# Patient Record
Sex: MALE | Race: WHITE | NOT HISPANIC OR LATINO | Employment: FULL TIME | URBAN - METROPOLITAN AREA
[De-identification: names, ages, dates, MRNs, and addresses within clinical notes are randomized per-mention and may not be internally consistent; named-entity substitution may affect disease eponyms.]

---

## 2017-03-31 ENCOUNTER — OFFICE VISIT (OUTPATIENT)
Dept: SPORTS MEDICINE | Facility: CLINIC | Age: 57
End: 2017-03-31

## 2017-03-31 VITALS
OXYGEN SATURATION: 98 % | HEART RATE: 54 BPM | SYSTOLIC BLOOD PRESSURE: 132 MMHG | BODY MASS INDEX: 24.25 KG/M2 | WEIGHT: 160 LBS | DIASTOLIC BLOOD PRESSURE: 78 MMHG | HEIGHT: 68 IN | RESPIRATION RATE: 14 BRPM

## 2017-03-31 DIAGNOSIS — M99.05 SOMATIC DYSFUNCTION OF PELVIS REGION: ICD-10-CM

## 2017-03-31 DIAGNOSIS — M54.50 ACUTE RIGHT-SIDED LOW BACK PAIN WITHOUT SCIATICA: Primary | ICD-10-CM

## 2017-03-31 DIAGNOSIS — M99.04 SACRAL REGION SOMATIC DYSFUNCTION: ICD-10-CM

## 2017-03-31 DIAGNOSIS — M99.03 LUMBAR REGION SOMATIC DYSFUNCTION: ICD-10-CM

## 2017-03-31 PROCEDURE — 98926 OSTEOPATH MANJ 3-4 REGIONS: CPT | Performed by: FAMILY MEDICINE

## 2017-03-31 PROCEDURE — 99213 OFFICE O/P EST LOW 20 MIN: CPT | Performed by: FAMILY MEDICINE

## 2017-03-31 RX ORDER — VARDENAFIL HYDROCHLORIDE 20 MG/1
TABLET ORAL
Status: ON HOLD | COMMUNITY
Start: 2015-02-09 | End: 2021-03-29 | Stop reason: ALTCHOICE

## 2017-03-31 RX ORDER — MAGNESIUM CARB/ALUMINUM HYDROX 105-160MG
TABLET,CHEWABLE ORAL
COMMUNITY
End: 2018-12-18

## 2017-03-31 RX ORDER — DEXTROAMPHETAMINE SACCHARATE, AMPHETAMINE ASPARTATE MONOHYDRATE, DEXTROAMPHETAMINE SULFATE AND AMPHETAMINE SULFATE 5; 5; 5; 5 MG/1; MG/1; MG/1; MG/1
20 CAPSULE, EXTENDED RELEASE ORAL
COMMUNITY
Start: 2017-03-13 | End: 2017-03-31 | Stop reason: SDUPTHER

## 2017-03-31 RX ORDER — AZATHIOPRINE 50 MG/1
150 TABLET ORAL
COMMUNITY
End: 2017-03-31 | Stop reason: SDUPTHER

## 2017-03-31 RX ORDER — MESALAMINE 0.38 G/1
3000 CAPSULE, EXTENDED RELEASE ORAL
COMMUNITY
End: 2017-03-31 | Stop reason: SDUPTHER

## 2017-03-31 RX ORDER — ESCITALOPRAM OXALATE 10 MG/1
10 TABLET ORAL
COMMUNITY
Start: 2016-12-01 | End: 2017-12-01

## 2017-03-31 RX ORDER — AMOXICILLIN 500 MG
1 CAPSULE ORAL
COMMUNITY
End: 2017-03-31 | Stop reason: SDUPTHER

## 2017-03-31 RX ORDER — ERGOCALCIFEROL (VITAMIN D2) 10 MCG
400 TABLET ORAL
COMMUNITY
End: 2018-02-05

## 2017-03-31 NOTE — PROGRESS NOTES
"John is a 57 y.o. year old male    Chief Complaint   Patient presents with   • Back Pain     Low rt side back pain. Some pain better.        History of Present Illness  LBP: x 6 d. Was lifting 50lb dumbbell overhead when pain began. No N/T, foot drop. Pain right side of low back. Took one dose of tizanidine previously written. Has not been doing back HEP as previously written. Requests OMT.    I have reviewed the patient's medical history in detail and updated the computerized patient record.    Review of Systems   Constitutional: Negative for chills and fever.   Respiratory: Negative for chest tightness, shortness of breath and wheezing.    Cardiovascular: Negative for chest pain.   Musculoskeletal: Positive for back pain. Negative for gait problem, neck pain and neck stiffness.   Neurological: Negative for weakness, numbness and headaches.   All other systems reviewed and are negative.      /78 (BP Location: Left arm, Patient Position: Sitting, Cuff Size: Adult)  Pulse 54  Resp 14  Ht 68\" (172.7 cm)  Wt 160 lb (72.6 kg)  SpO2 98%  BMI 24.33 kg/m2    Physical Exam    Vital signs reviewed.   General: No acute distress.  Eyes: conjunctiva clear; pupils equally round and reactive  ENT: external ears and nose atraumatic; oropharynx clear  CV: no peripheral edema, 2+ radial pulse  Resp: normal respiratory effort, no use of accessory muscles  Skin: no rashes or wounds; normal turgor  Psych: mood and affect appropriate; recent and remote memory intact    Osteopathic exam:  Tissue texture changes lumbar, sacral, pelvic region  L2-4 neutral, sidebent left and rotated right  Superior pole right sacrum restricted  Right anterior innominate    Procedure: osteopathic manipulative treatment    Risks, benefits and alternatives discussed with patient and verbal consent obtained. Area of maximal tenderness palpated and documented above. Treatment included: muscle energy, HVLA and myofascial release. Patient " tolerated treatment well and demonstrated decreased tenderness, improved range of motion. Post treatment instructions given verbally.    Diagnoses and all orders for this visit:    Acute right-sided low back pain without sciatica    Lumbar region somatic dysfunction    Sacral region somatic dysfunction    Somatic dysfunction of pelvis region    Other orders  -     escitalopram (LEXAPRO) 10 MG tablet; Take 10 mg by mouth.  -     vardenafil (LEVITRA) 20 MG tablet; TAKE ONE-HALF TO ONE TABLET BY MOUTH 1 HOUR PRIOR TO INTERCOURSE AS NEEDED  -     Discontinue: mesalamine (APRISO) 0.375 G 24 hr capsule; Take 3,000 mg by mouth.  -     Discontinue: amphetamine-dextroamphetamine XR (ADDERALL XR) 20 MG 24 hr capsule; Take 20 mg by mouth.  -     Discontinue: azaTHIOprine (IMURAN) 50 MG tablet; Take 150 mg by mouth.  -     Vitamin D, Cholecalciferol, (CHOLECALCIFEROL) 400 UNITS tablet; Take 400 Units by mouth.  -     magnesium citrate 1.745 GM/30ML solution solution; Take  by mouth.  -     Discontinue: Omega-3 Fatty Acids (FISH OIL) 1200 MG capsule capsule; Take 1 capsule by mouth.      Tolerated OMT as above. Dramatic improvement in pain relief prior to leaving office. Can resume taking tizanidine as previously written. HEP. See back as needed.

## 2018-02-05 ENCOUNTER — OFFICE VISIT (OUTPATIENT)
Dept: INTERNAL MEDICINE | Facility: CLINIC | Age: 58
End: 2018-02-05

## 2018-02-05 VITALS
DIASTOLIC BLOOD PRESSURE: 74 MMHG | HEIGHT: 68 IN | WEIGHT: 165 LBS | BODY MASS INDEX: 25.01 KG/M2 | SYSTOLIC BLOOD PRESSURE: 118 MMHG

## 2018-02-05 DIAGNOSIS — M25.561 CHRONIC PAIN OF BOTH KNEES: Chronic | ICD-10-CM

## 2018-02-05 DIAGNOSIS — G89.29 CHRONIC PAIN OF BOTH KNEES: Chronic | ICD-10-CM

## 2018-02-05 DIAGNOSIS — F98.8 ATTENTION DEFICIT DISORDER (ADD) WITHOUT HYPERACTIVITY: ICD-10-CM

## 2018-02-05 DIAGNOSIS — Z87.19 S/P INGUINAL HERNIA REPAIR USING SYNTHETIC PATCH: Chronic | ICD-10-CM

## 2018-02-05 DIAGNOSIS — Z98.890 S/P INGUINAL HERNIA REPAIR USING SYNTHETIC PATCH: Chronic | ICD-10-CM

## 2018-02-05 DIAGNOSIS — M25.562 CHRONIC PAIN OF BOTH KNEES: Chronic | ICD-10-CM

## 2018-02-05 DIAGNOSIS — K51.90 CHRONIC ULCERATIVE COLITIS WITHOUT COMPLICATION, UNSPECIFIED LOCATION (HCC): Primary | Chronic | ICD-10-CM

## 2018-02-05 DIAGNOSIS — N52.9 ED (ERECTILE DYSFUNCTION) OF ORGANIC ORIGIN: ICD-10-CM

## 2018-02-05 PROBLEM — N50.811 TESTICULAR PAIN, RIGHT: Status: ACTIVE | Noted: 2017-05-04

## 2018-02-05 PROCEDURE — 99204 OFFICE O/P NEW MOD 45 MIN: CPT | Performed by: INTERNAL MEDICINE

## 2018-02-05 RX ORDER — ESCITALOPRAM OXALATE 10 MG/1
TABLET ORAL
COMMUNITY
Start: 2017-11-08 | End: 2018-02-05

## 2018-02-05 RX ORDER — DEXTROAMPHETAMINE SACCHARATE, AMPHETAMINE ASPARTATE, DEXTROAMPHETAMINE SULFATE AND AMPHETAMINE SULFATE 5; 5; 5; 5 MG/1; MG/1; MG/1; MG/1
20 TABLET ORAL DAILY
Qty: 90 TABLET | Refills: 0 | Status: SHIPPED | OUTPATIENT
Start: 2018-02-05 | End: 2018-05-04 | Stop reason: SDUPTHER

## 2018-02-05 NOTE — PROGRESS NOTES
Subjective   John Reis is a 57 y.o. male.     History of Present Illness     The following portions of the patient's history were reviewed and updated as appropriate: allergies, current medications, past family history, past medical history, past social history, past surgical history and problem list.  58 yo/m with bilateral knee pain, left hip pain, ADD, UC (stable), mild anxiety here for follow up and renewal of his medications. He cannot take NSAID's for his knees due to his UC. I highly recommended that he see his Orthopedic surgeon for further options.  Review of Systems   Constitutional: Negative.    HENT: Negative.    Eyes: Negative.    Respiratory: Negative.    Cardiovascular: Negative.    Gastrointestinal: Negative.    Endocrine: Negative.    Genitourinary: Negative.    Musculoskeletal: Positive for arthralgias.   Skin: Negative.    Allergic/Immunologic: Negative.    Neurological: Negative.    Hematological: Negative.    Psychiatric/Behavioral: Positive for decreased concentration. The patient is nervous/anxious.        Objective   Physical Exam   Constitutional: He is oriented to person, place, and time. He appears well-developed and well-nourished.   HENT:   Head: Normocephalic and atraumatic.   Eyes: EOM are normal. Pupils are equal, round, and reactive to light.   Neck: Normal range of motion. Neck supple.   Cardiovascular: Normal rate, regular rhythm and normal heart sounds.    Pulmonary/Chest: Effort normal and breath sounds normal.   Abdominal: Soft. Bowel sounds are normal.   Musculoskeletal:   Bilateral knee pain with left hip pain with FLM   Neurological: He is alert and oriented to person, place, and time. He has normal reflexes.   Skin: Skin is warm and dry.   Psychiatric: He has a normal mood and affect. His behavior is normal. Judgment and thought content normal.   Nursing note and vitals reviewed.      Assessment/Plan   John was seen today for adhd and  hyperlipidemia.    Diagnoses and all orders for this visit:    Chronic ulcerative colitis without complication, unspecified location  Comments:  colonoscopy every 2 years    ED (erectile dysfunction) of organic origin  Comments:  compensated at present    S/P inguinal hernia repair using synthetic patch  Comments:  recently checked and was ok    Attention deficit disorder (ADD) without hyperactivity  Comments:  does well with the adderrall    Chronic pain of both knees  Comments:  cannot take NSAID's due to his colitis, will check with an orthopedist    Other orders  -     amphetamine-dextroamphetamine (ADDERALL) 20 MG tablet; Take 1 tablet by mouth Daily.

## 2018-02-12 ENCOUNTER — TELEPHONE (OUTPATIENT)
Dept: INTERNAL MEDICINE | Facility: CLINIC | Age: 58
End: 2018-02-12

## 2018-02-12 NOTE — TELEPHONE ENCOUNTER
----- Message from Veronika Ruvalcaba sent at 2/12/2018 10:10 AM EST -----  Contact: Trenton Pinzon, pharmacist with [facility] called.  He has the patient's Adderall prescription, and states it was written for Adderall IR.      Pharmacist states the patient has always been on Adderall XR capsules in the past, and needs clarification.  Please advise.     [facility]:  [phone]  Reference #:  [id number]

## 2018-02-13 NOTE — TELEPHONE ENCOUNTER
Informed Sterling (pharmacist) that Mr. Reis is to be taking Adderall XR 20mg daily per Dr Gordillo.

## 2018-05-04 NOTE — TELEPHONE ENCOUNTER
----- Message from Bianca Brewer sent at 5/4/2018  1:46 PM EDT -----  Contact: Patient  Patient requesting refill on     amphetamine-dextroamphetamine (ADDERALL) 20 MG tablet    Patient:[phone]

## 2018-05-07 RX ORDER — DEXTROAMPHETAMINE SACCHARATE, AMPHETAMINE ASPARTATE, DEXTROAMPHETAMINE SULFATE AND AMPHETAMINE SULFATE 5; 5; 5; 5 MG/1; MG/1; MG/1; MG/1
20 TABLET ORAL DAILY
Qty: 90 TABLET | Refills: 0 | Status: SHIPPED | OUTPATIENT
Start: 2018-05-07 | End: 2018-07-23 | Stop reason: SDUPTHER

## 2018-05-17 ENCOUNTER — TELEPHONE (OUTPATIENT)
Dept: INTERNAL MEDICINE | Facility: CLINIC | Age: 58
End: 2018-05-17

## 2018-05-17 NOTE — TELEPHONE ENCOUNTER
----- Message from Dorothy Correa sent at 5/17/2018  9:14 AM EDT -----  Contact: Jacques with [facility]  Pharmacy is calling to get clarification on or to change    RX:amphetamine-dextroamphetamine (ADDERALL) 20 MG tablet    Immediate vs extended, pt usually gets extended. Was sent as immediate.    Caller#[phone]  Ref# [id number]

## 2018-06-11 ENCOUNTER — OFFICE VISIT (OUTPATIENT)
Dept: INTERNAL MEDICINE | Facility: CLINIC | Age: 58
End: 2018-06-11

## 2018-06-11 VITALS
HEIGHT: 68 IN | BODY MASS INDEX: 24.25 KG/M2 | DIASTOLIC BLOOD PRESSURE: 72 MMHG | WEIGHT: 160 LBS | SYSTOLIC BLOOD PRESSURE: 124 MMHG

## 2018-06-11 DIAGNOSIS — K51.90 CHRONIC ULCERATIVE COLITIS WITHOUT COMPLICATION, UNSPECIFIED LOCATION (HCC): Chronic | ICD-10-CM

## 2018-06-11 DIAGNOSIS — Z96.642 STATUS POST LEFT HIP REPLACEMENT: Primary | Chronic | ICD-10-CM

## 2018-06-11 DIAGNOSIS — N52.9 ED (ERECTILE DYSFUNCTION) OF ORGANIC ORIGIN: ICD-10-CM

## 2018-06-11 DIAGNOSIS — F98.8 ATTENTION DEFICIT DISORDER (ADD) WITHOUT HYPERACTIVITY: ICD-10-CM

## 2018-06-11 PROBLEM — M16.12 PRIMARY OSTEOARTHRITIS OF LEFT HIP: Status: ACTIVE | Noted: 2018-03-29

## 2018-06-11 PROBLEM — M25.552 LEFT HIP PAIN: Status: ACTIVE | Noted: 2018-03-29

## 2018-06-11 PROCEDURE — 99214 OFFICE O/P EST MOD 30 MIN: CPT | Performed by: INTERNAL MEDICINE

## 2018-06-11 RX ORDER — ASPIRIN 81 MG/1
81 TABLET ORAL DAILY
COMMUNITY
End: 2018-12-18

## 2018-06-11 NOTE — PROGRESS NOTES
Subjective   John Reis is a 58 y.o. male.     History of Present Illness     The following portions of the patient's history were reviewed and updated as appropriate: allergies, current medications, past family history, past medical history, past social history, past surgical history and problem list.  57 yo/m with ADD, UC (well controlled), ED, with a left total hip replacement here for follow up. He is doing well except for some issues with hot flashes at night that wake him up frequently. I recommended a hormonal evaluation and possible suplimentation.  Review of Systems   Constitutional: Negative.    HENT: Negative.    Eyes: Negative.    Respiratory: Negative.    Cardiovascular: Negative.    Gastrointestinal: Negative.    Endocrine: Negative.    Genitourinary: Negative.    Musculoskeletal: Positive for arthralgias.   Skin: Negative.    Allergic/Immunologic: Negative.    Neurological: Negative.    Hematological: Negative.    Psychiatric/Behavioral: Negative.        Objective   Physical Exam   Constitutional: He is oriented to person, place, and time. He appears well-developed and well-nourished.   HENT:   Head: Normocephalic and atraumatic.   Eyes: EOM are normal. Pupils are equal, round, and reactive to light.   Neck: Normal range of motion. Neck supple.   Cardiovascular: Normal rate, regular rhythm and normal heart sounds.    Pulmonary/Chest: Effort normal and breath sounds normal.   Abdominal: Soft. Bowel sounds are normal.   Musculoskeletal:   S/p left THR with an anterior approach   Neurological: He is alert and oriented to person, place, and time.   Skin: Skin is warm and dry.   Psychiatric: He has a normal mood and affect. His behavior is normal. Judgment and thought content normal.   Nursing note and vitals reviewed.        Assessment/Plan   John was seen today for follow-up.    Diagnoses and all orders for this visit:    Status post left hip replacement  Comments:  doing very well post  surgery    Attention deficit disorder (ADD) without hyperactivity  Comments:  doing very well adderall    Chronic ulcerative colitis without complication, unspecified location  Comments:  doing very well overall    ED (erectile dysfunction) of organic origin  Comments:  levitra prn

## 2018-07-23 RX ORDER — DEXTROAMPHETAMINE SACCHARATE, AMPHETAMINE ASPARTATE, DEXTROAMPHETAMINE SULFATE AND AMPHETAMINE SULFATE 5; 5; 5; 5 MG/1; MG/1; MG/1; MG/1
20 TABLET ORAL DAILY
Qty: 90 TABLET | Refills: 0 | Status: SHIPPED | OUTPATIENT
Start: 2018-07-23 | End: 2018-11-02 | Stop reason: SDUPTHER

## 2018-07-23 NOTE — TELEPHONE ENCOUNTER
----- Message from Celeste Trejo sent at 7/23/2018 11:10 AM EDT -----  Contact: pt - Dr Gordillo's pt - RE: new script  Pt calling and would like a new script for Rx. Pt informs that Rx was delivered by mail order. Pt informs that when delivered, Rx needed a signature. Pt was not home at time of delivery to sign for, therefore, Rx was sent back to pharmacy - [facility]. Pt informs is now out of medication. Could you please write new script for pt to order Rx? Please advise. Thanks     amphetamine-dextroamphetamine (ADDERALL) 20 MG tablet 90 tablet    Sig - Route: Take 1 tablet by mouth Daily. - Oral     Pt was informed that script needs to be mailed by pt. Office can not E-scribe Rx. Could you please call pt when script is ready to be picked up?      Pt # [phone]

## 2018-07-24 ENCOUNTER — TELEPHONE (OUTPATIENT)
Dept: INTERNAL MEDICINE | Facility: CLINIC | Age: 58
End: 2018-07-24

## 2018-07-24 NOTE — TELEPHONE ENCOUNTER
----- Message from Dorothy Correa sent at 7/24/2018 10:15 AM EDT -----  Contact: Bethany rosario.  Pharmacy is calling to get clarification on or to change    RX:amphetamine-dextroamphetamine (ADDERALL) 20 MG tablet     Clarify, patients history is for extended release.      Caller#[phone]    Ref# [id number]

## 2018-09-17 ENCOUNTER — OFFICE VISIT (OUTPATIENT)
Dept: INTERNAL MEDICINE | Facility: CLINIC | Age: 58
End: 2018-09-17

## 2018-09-17 ENCOUNTER — TELEPHONE (OUTPATIENT)
Dept: INTERNAL MEDICINE | Facility: CLINIC | Age: 58
End: 2018-09-17

## 2018-09-17 VITALS
BODY MASS INDEX: 24.1 KG/M2 | DIASTOLIC BLOOD PRESSURE: 78 MMHG | SYSTOLIC BLOOD PRESSURE: 126 MMHG | HEIGHT: 68 IN | WEIGHT: 159 LBS

## 2018-09-17 DIAGNOSIS — F98.8 ATTENTION DEFICIT DISORDER (ADD) WITHOUT HYPERACTIVITY: Primary | ICD-10-CM

## 2018-09-17 DIAGNOSIS — G89.29 CHRONIC PAIN OF BOTH KNEES: ICD-10-CM

## 2018-09-17 DIAGNOSIS — M25.561 CHRONIC PAIN OF BOTH KNEES: ICD-10-CM

## 2018-09-17 DIAGNOSIS — Z23 NEED FOR VACCINATION: ICD-10-CM

## 2018-09-17 DIAGNOSIS — M25.562 CHRONIC PAIN OF BOTH KNEES: ICD-10-CM

## 2018-09-17 DIAGNOSIS — N52.9 ED (ERECTILE DYSFUNCTION) OF ORGANIC ORIGIN: Chronic | ICD-10-CM

## 2018-09-17 DIAGNOSIS — K51.90 CHRONIC ULCERATIVE COLITIS WITHOUT COMPLICATION, UNSPECIFIED LOCATION (HCC): ICD-10-CM

## 2018-09-17 DIAGNOSIS — F32.4 MAJOR DEPRESSIVE DISORDER WITH SINGLE EPISODE, IN PARTIAL REMISSION (HCC): ICD-10-CM

## 2018-09-17 PROCEDURE — 99214 OFFICE O/P EST MOD 30 MIN: CPT | Performed by: INTERNAL MEDICINE

## 2018-09-17 NOTE — TELEPHONE ENCOUNTER
----- Message from Cyn Nix sent at 9/17/2018 11:16 AM EDT -----  Pt last urinalysis stated he had trace ketones and pt was concerned. Pt has family history of diabetes. Pt forgot to bring this up to Dr Gordillo.   If any concerns please call pt at [phone]

## 2018-09-17 NOTE — PROGRESS NOTES
Subjective   John Reis is a 58 y.o. male. With a chief complaint of anxiety and depression associated with a very difficult divorce that he is working through. Fortunately he is working with a therapist, and has a . He also has well controlled UC, ED and chronic knee pain here for follow up.    History of Present Illness     The following portions of the patient's history were reviewed and updated as appropriate: allergies, current medications, past family history, past medical history, past social history, past surgical history and problem list.    Review of Systems   Constitutional: Negative.    HENT: Negative.    Eyes: Negative.    Respiratory: Negative.    Cardiovascular: Negative.    Gastrointestinal: Negative.    Endocrine: Negative.    Musculoskeletal: Positive for arthralgias.   Skin: Negative.    Allergic/Immunologic: Negative.    Neurological: Negative.    Hematological: Negative.    Psychiatric/Behavioral: Positive for dysphoric mood and depressed mood. The patient is nervous/anxious.        Objective   Physical Exam   Constitutional: He is oriented to person, place, and time. He appears well-developed and well-nourished.   HENT:   Head: Normocephalic and atraumatic.   Eyes: Pupils are equal, round, and reactive to light. EOM are normal.   Neck: Normal range of motion. Neck supple.   Cardiovascular: Normal rate, regular rhythm and normal heart sounds.    Pulmonary/Chest: Effort normal and breath sounds normal.   Abdominal: Soft. Bowel sounds are normal.   Musculoskeletal:   Bilateral knee arthropathy   Neurological: He is alert and oriented to person, place, and time.   Skin: Skin is warm and dry.   Psychiatric: He has a normal mood and affect. His behavior is normal. Judgment and thought content normal.   Anxious and dysphoric   Nursing note and vitals reviewed.        Assessment/Plan   John was seen today for add.    Diagnoses and all orders for this visit:    Attention deficit  disorder (ADD) without hyperactivity  Comments:  does well with adderall xr    Major depressive disorder with single episode, in partial remission (CMS/[facility])  Comments:  currently undergoing a difficult divorce, working with CBT    Chronic ulcerative colitis without complication, unspecified location (CMS/[facility])  Comments:  doing well with current therapy    Chronic pain of both knees  Comments:  NSAID's prn    ED (erectile dysfunction) of organic origin  Comments:  well compensated

## 2018-11-02 NOTE — TELEPHONE ENCOUNTER
----- Message from Celeste Trejo sent at 11/2/2018  3:37 PM EDT -----  Contact: pt - Dr BRUNO's pt - RE: script    Pt calling and would like a refill on Rx      amphetamine-dextroamphetamine (ADDERALL) 20 MG tablet 90 tablet    Sig - Route: Take 1 tablet by mouth Daily. - Oral       [facility] - [address] [phone] [facility] [phone] FX    Pt # [phone]   no

## 2018-11-05 RX ORDER — DEXTROAMPHETAMINE SACCHARATE, AMPHETAMINE ASPARTATE, DEXTROAMPHETAMINE SULFATE AND AMPHETAMINE SULFATE 5; 5; 5; 5 MG/1; MG/1; MG/1; MG/1
20 TABLET ORAL DAILY
Qty: 90 TABLET | Refills: 0 | Status: SHIPPED | OUTPATIENT
Start: 2018-11-05 | End: 2018-11-08 | Stop reason: CLARIF

## 2018-11-06 DIAGNOSIS — F98.8 ATTENTION DEFICIT DISORDER, UNSPECIFIED HYPERACTIVITY PRESENCE: Primary | ICD-10-CM

## 2018-11-06 NOTE — TELEPHONE ENCOUNTER
----- Message from Dorothy Correa sent at 11/6/2018 10:39 AM EST -----  Contact: Charlie with [facility]  Calling to get clarification or change medication      RX:amphetamine-dextroamphetamine (ADDERALL) 20 MG tablet        90 tablet                        Sig - Route: Take 1 tablet by mouth Daily. - Oral          Pt has a history of being on XR tablet, wanted to make sure that he is no longer suppose to take that.    Caller# [phone]  Ref#  [id number]

## 2018-11-09 RX ORDER — DEXTROAMPHETAMINE SACCHARATE, AMPHETAMINE ASPARTATE MONOHYDRATE, DEXTROAMPHETAMINE SULFATE AND AMPHETAMINE SULFATE 5; 5; 5; 5 MG/1; MG/1; MG/1; MG/1
20 CAPSULE, EXTENDED RELEASE ORAL EVERY MORNING
Qty: 90 CAPSULE | Refills: 0 | Status: SHIPPED | OUTPATIENT
Start: 2018-11-09 | End: 2019-02-11 | Stop reason: SDUPTHER

## 2018-12-18 ENCOUNTER — OFFICE VISIT (OUTPATIENT)
Dept: INTERNAL MEDICINE | Facility: CLINIC | Age: 58
End: 2018-12-18

## 2018-12-18 VITALS
DIASTOLIC BLOOD PRESSURE: 70 MMHG | WEIGHT: 156 LBS | SYSTOLIC BLOOD PRESSURE: 104 MMHG | HEIGHT: 68 IN | BODY MASS INDEX: 23.64 KG/M2

## 2018-12-18 DIAGNOSIS — M67.40 GANGLION CYST: ICD-10-CM

## 2018-12-18 DIAGNOSIS — N52.9 ED (ERECTILE DYSFUNCTION) OF ORGANIC ORIGIN: ICD-10-CM

## 2018-12-18 DIAGNOSIS — F98.8 ATTENTION DEFICIT DISORDER (ADD) WITHOUT HYPERACTIVITY: ICD-10-CM

## 2018-12-18 DIAGNOSIS — K51.90 CHRONIC ULCERATIVE COLITIS WITHOUT COMPLICATION, UNSPECIFIED LOCATION (HCC): Primary | ICD-10-CM

## 2018-12-18 PROCEDURE — 99214 OFFICE O/P EST MOD 30 MIN: CPT | Performed by: INTERNAL MEDICINE

## 2018-12-18 NOTE — PROGRESS NOTES
Subjective   John Reis is a 58 y.o. male. With a chief complaint of anxiety and depression associated with a very difficult divorce that he is working through. Fortunately he is working with a therapist, and has a . He also has well controlled UC, ED, here for follow up. His UC is in excellent. He recently notice a small cyst at the base of his 3rd finger of his left hand consistent with a ganglion cyst.    History of Present Illness     The following portions of the patient's history were reviewed and updated as appropriate: allergies, current medications, past family history, past medical history, past social history, past surgical history and problem list.    Review of Systems   Constitutional: Negative.    HENT: Negative.    Eyes: Negative.    Respiratory: Negative.    Cardiovascular: Negative.    Gastrointestinal: Negative.    Endocrine: Negative.    Genitourinary: Positive for erectile dysfunction.   Musculoskeletal: Negative.    Skin: Negative.    Allergic/Immunologic: Negative.    Neurological: Negative.    Hematological: Negative.    Psychiatric/Behavioral: Positive for decreased concentration.       Objective   Physical Exam   Constitutional: He is oriented to person, place, and time. He appears well-developed and well-nourished.   HENT:   Head: Normocephalic and atraumatic.   Eyes: EOM are normal. Pupils are equal, round, and reactive to light.   Cardiovascular: Normal rate, regular rhythm and normal heart sounds.   Pulmonary/Chest: Effort normal and breath sounds normal.   Abdominal: Soft. Bowel sounds are normal.   Musculoskeletal: Normal range of motion.   Neurological: He is alert and oriented to person, place, and time.   Skin: Skin is warm and dry.   Psychiatric: He has a normal mood and affect. His behavior is normal. Judgment and thought content normal.   Nursing note and vitals reviewed.        Assessment/Plan   John was seen today for add and hand pain.    Diagnoses and  all orders for this visit:    Chronic ulcerative colitis without complication, unspecified location (CMS/[facility])  Comments:  very well controlled    ED (erectile dysfunction) of organic origin  Comments:  no change in therapy    Attention deficit disorder (ADD) without hyperactivity  Comments:  continue adderall    Ganglion cyst  Comments:  hand surgery follow up   Orders:  -     Ambulatory Referral to Hand Surgery

## 2019-02-11 DIAGNOSIS — F98.8 ATTENTION DEFICIT DISORDER, UNSPECIFIED HYPERACTIVITY PRESENCE: ICD-10-CM

## 2019-02-11 NOTE — TELEPHONE ENCOUNTER
----- Message from Celeste Trejo sent at 2/11/2019  2:01 PM EST -----  Contact: pt - Dr BRUNO's pt - RE: script  Pt calling and would like a refill on Rx        amphetamine-dextroamphetamine XR (ADDERALL XR) 20 MG 24 hr capsule 90 capsule  Sig - Route: Take 1 capsule by mouth Every Morning - Oral     [facility] - [address] [phone] [facility] [phone] FX    Pt # [phone]

## 2019-02-12 RX ORDER — DEXTROAMPHETAMINE SACCHARATE, AMPHETAMINE ASPARTATE MONOHYDRATE, DEXTROAMPHETAMINE SULFATE AND AMPHETAMINE SULFATE 5; 5; 5; 5 MG/1; MG/1; MG/1; MG/1
20 CAPSULE, EXTENDED RELEASE ORAL EVERY MORNING
Qty: 90 CAPSULE | Refills: 0 | Status: SHIPPED | OUTPATIENT
Start: 2019-02-12 | End: 2019-02-21 | Stop reason: SDUPTHER

## 2019-02-20 ENCOUNTER — TELEPHONE (OUTPATIENT)
Dept: INTERNAL MEDICINE | Facility: CLINIC | Age: 59
End: 2019-02-20

## 2019-02-20 DIAGNOSIS — F98.8 ATTENTION DEFICIT DISORDER, UNSPECIFIED HYPERACTIVITY PRESENCE: ICD-10-CM

## 2019-02-20 NOTE — TELEPHONE ENCOUNTER
----- Message from Bianca Brewer sent at 2/20/2019  3:39 PM EST -----  Contact: Patient  Patient calling and states his prescription for amphetamine-dextroamphetamine XR (ADDERALL XR) 20 MG 24 hr capsule was called to the wrong pharmacy. Please advise    Patient:[phone]    Pharmacy:[facility] - [address] [phone] [facility] [phone]

## 2019-02-21 RX ORDER — DEXTROAMPHETAMINE SACCHARATE, AMPHETAMINE ASPARTATE MONOHYDRATE, DEXTROAMPHETAMINE SULFATE AND AMPHETAMINE SULFATE 5; 5; 5; 5 MG/1; MG/1; MG/1; MG/1
20 CAPSULE, EXTENDED RELEASE ORAL EVERY MORNING
Qty: 90 CAPSULE | Refills: 0 | Status: SHIPPED | OUTPATIENT
Start: 2019-02-21 | End: 2019-05-23 | Stop reason: SDUPTHER

## 2019-05-23 DIAGNOSIS — F98.8 ATTENTION DEFICIT DISORDER, UNSPECIFIED HYPERACTIVITY PRESENCE: ICD-10-CM

## 2019-05-23 NOTE — TELEPHONE ENCOUNTER
----- Message from Dorothy Correa sent at 5/23/2019  1:50 PM EDT -----  Contact: pt  Pt calling would like refill on     RX: amphetamine-dextroamphetamine XR (ADDERALL XR) 20 MG 24 hr capsule    Pt#[phone]

## 2019-05-24 RX ORDER — DEXTROAMPHETAMINE SACCHARATE, AMPHETAMINE ASPARTATE MONOHYDRATE, DEXTROAMPHETAMINE SULFATE AND AMPHETAMINE SULFATE 5; 5; 5; 5 MG/1; MG/1; MG/1; MG/1
20 CAPSULE, EXTENDED RELEASE ORAL EVERY MORNING
Qty: 30 CAPSULE | Refills: 0 | Status: SHIPPED | OUTPATIENT
Start: 2019-05-24 | End: 2019-06-11 | Stop reason: SDUPTHER

## 2019-06-11 DIAGNOSIS — F98.8 ATTENTION DEFICIT DISORDER, UNSPECIFIED HYPERACTIVITY PRESENCE: ICD-10-CM

## 2019-06-11 RX ORDER — DEXTROAMPHETAMINE SACCHARATE, AMPHETAMINE ASPARTATE MONOHYDRATE, DEXTROAMPHETAMINE SULFATE AND AMPHETAMINE SULFATE 5; 5; 5; 5 MG/1; MG/1; MG/1; MG/1
20 CAPSULE, EXTENDED RELEASE ORAL EVERY MORNING
Qty: 30 CAPSULE | Refills: 0 | Status: SHIPPED | OUTPATIENT
Start: 2019-06-11 | End: 2019-09-03 | Stop reason: SDUPTHER

## 2019-06-11 NOTE — TELEPHONE ENCOUNTER
----- Message from Celeste Trejo sent at 6/11/2019  2:54 PM EDT -----  Contact: pt - Dr BRUNO's pt - RE: script    Pt calling and would like a refill on Rx    amphetamine-dextroamphetamine XR (ADDERALL XR) 20 MG 24 hr capsule 90 day supply     [facility] - [address] [phone] [facility] [phone] FX    Pt # [phone]

## 2019-06-11 NOTE — TELEPHONE ENCOUNTER
Last OV   12/18/2018  Next OV 06/20/219  HOLGER done 05/28    please review med refill and advise

## 2019-06-20 ENCOUNTER — OFFICE VISIT (OUTPATIENT)
Dept: INTERNAL MEDICINE | Facility: CLINIC | Age: 59
End: 2019-06-20

## 2019-06-20 VITALS
OXYGEN SATURATION: 97 % | BODY MASS INDEX: 23.87 KG/M2 | WEIGHT: 157 LBS | HEART RATE: 51 BPM | SYSTOLIC BLOOD PRESSURE: 110 MMHG | DIASTOLIC BLOOD PRESSURE: 78 MMHG

## 2019-06-20 DIAGNOSIS — K51.90 CHRONIC ULCERATIVE COLITIS WITHOUT COMPLICATION, UNSPECIFIED LOCATION (HCC): Primary | Chronic | ICD-10-CM

## 2019-06-20 DIAGNOSIS — N52.9 ED (ERECTILE DYSFUNCTION) OF ORGANIC ORIGIN: Chronic | ICD-10-CM

## 2019-06-20 DIAGNOSIS — F98.8 ATTENTION DEFICIT DISORDER (ADD) WITHOUT HYPERACTIVITY: Chronic | ICD-10-CM

## 2019-06-20 DIAGNOSIS — B35.1 TINEA OF NAIL: Chronic | ICD-10-CM

## 2019-06-20 PROCEDURE — 99214 OFFICE O/P EST MOD 30 MIN: CPT | Performed by: INTERNAL MEDICINE

## 2019-06-20 RX ORDER — KETOCONAZOLE 200 MG/1
200 TABLET ORAL DAILY
Qty: 30 TABLET | Refills: 3 | Status: SHIPPED | OUTPATIENT
Start: 2019-06-20 | End: 2020-09-15

## 2019-06-20 NOTE — PROGRESS NOTES
Subjective   John Reis is a 59 y.o. male. Presents with a chief complaint ADD, ED, UC, tinea unguam, anxiety/depression here for follow up and evaluation.    History of Present Illness recent onset tinea unguam of both first toes    The following portions of the patient's history were reviewed and updated as appropriate: allergies, current medications, past family history, past medical history, past social history, past surgical history and problem list.    Review of Systems   Constitutional: Negative.    HENT: Negative.    Eyes: Negative.    Respiratory: Negative.    Cardiovascular: Negative.    Gastrointestinal: Negative.    Endocrine: Negative.    Genitourinary: Positive for erectile dysfunction.   Musculoskeletal: Positive for arthralgias.   Skin: Negative.    Allergic/Immunologic: Negative.    Neurological: Negative.    Hematological: Negative.    Psychiatric/Behavioral: Positive for dysphoric mood.       Objective   Physical Exam   Constitutional: He appears well-developed and well-nourished.   HENT:   Head: Normocephalic and atraumatic.   Eyes: EOM are normal. Pupils are equal, round, and reactive to light.   Neck: Normal range of motion.   Cardiovascular: Normal rate, regular rhythm and normal heart sounds.   Pulmonary/Chest: Effort normal and breath sounds normal.   Abdominal: Soft. Bowel sounds are normal.   Musculoskeletal: Normal range of motion.   Neurological: He is alert.   Skin: Skin is warm.   Tinea unguam   Psychiatric: He has a normal mood and affect.   Nursing note and vitals reviewed.        Assessment/Plan   John was seen today for add and ulcerative colitis.    Diagnoses and all orders for this visit:    Chronic ulcerative colitis without complication, unspecified location (CMS/[facility])  Comments:  doing well overall    ED (erectile dysfunction) of organic origin  Comments:  levitra 20 mg prn    Attention deficit disorder (ADD) without hyperactivity  Comments:  continue with  adderall    Tinea of nail  Comments:  nizoral 100 mg per day    Other orders  -     ketoconazole (NIZORAL) 200 MG tablet; Take 1 tablet by mouth Daily.

## 2019-09-03 ENCOUNTER — TELEPHONE (OUTPATIENT)
Dept: INTERNAL MEDICINE | Facility: CLINIC | Age: 59
End: 2019-09-03

## 2019-09-03 DIAGNOSIS — F98.8 ATTENTION DEFICIT DISORDER, UNSPECIFIED HYPERACTIVITY PRESENCE: ICD-10-CM

## 2019-09-03 NOTE — TELEPHONE ENCOUNTER
----- Message from Veronika Ruvalcaba sent at 9/3/2019  9:48 AM EDT -----  Contact: Spouse  Patient's wife called requesting refill on patient's    amphetamine-dextroamphetamine XR (ADDERALL XR) 20 MG 24 hr capsule, 90-day supply please.  Please advise.     Patient:  [phone]    [facility] - [address] [phone] [facility] [phone]

## 2019-09-04 RX ORDER — DEXTROAMPHETAMINE SULFATE, DEXTROAMPHETAMINE SACCHARATE, AMPHETAMINE SULFATE AND AMPHETAMINE ASPARTATE 5; 5; 5; 5 MG/1; MG/1; MG/1; MG/1
20 CAPSULE, EXTENDED RELEASE ORAL EVERY MORNING
Qty: 30 CAPSULE | Refills: 0 | Status: SHIPPED | OUTPATIENT
Start: 2019-09-04 | End: 2019-09-09 | Stop reason: SDUPTHER

## 2019-09-05 NOTE — TELEPHONE ENCOUNTER
pts wife calling. Says request is for 90 day supply to go to mail order.  She said when Dr Gordillo was at [facility] he always gave pt 90 days.  Would like to see if they can get 90 sent today to mailorder.  She would like a call to mailorder to correct    Pt wife# [phone]

## 2019-09-09 ENCOUNTER — OFFICE VISIT (OUTPATIENT)
Dept: INTERNAL MEDICINE | Facility: CLINIC | Age: 59
End: 2019-09-09

## 2019-09-09 VITALS
SYSTOLIC BLOOD PRESSURE: 120 MMHG | HEART RATE: 53 BPM | BODY MASS INDEX: 24.25 KG/M2 | DIASTOLIC BLOOD PRESSURE: 80 MMHG | HEIGHT: 68 IN | OXYGEN SATURATION: 99 % | WEIGHT: 160 LBS

## 2019-09-09 DIAGNOSIS — F98.8 ATTENTION DEFICIT DISORDER (ADD) WITHOUT HYPERACTIVITY: ICD-10-CM

## 2019-09-09 DIAGNOSIS — M25.561 CHRONIC PAIN OF BOTH KNEES: ICD-10-CM

## 2019-09-09 DIAGNOSIS — G89.29 CHRONIC PAIN OF BOTH KNEES: ICD-10-CM

## 2019-09-09 DIAGNOSIS — N52.9 ED (ERECTILE DYSFUNCTION) OF ORGANIC ORIGIN: ICD-10-CM

## 2019-09-09 DIAGNOSIS — F98.8 ATTENTION DEFICIT DISORDER, UNSPECIFIED HYPERACTIVITY PRESENCE: ICD-10-CM

## 2019-09-09 DIAGNOSIS — K51.90 CHRONIC ULCERATIVE COLITIS WITHOUT COMPLICATION, UNSPECIFIED LOCATION (HCC): Primary | ICD-10-CM

## 2019-09-09 DIAGNOSIS — M25.562 CHRONIC PAIN OF BOTH KNEES: ICD-10-CM

## 2019-09-09 PROCEDURE — 99214 OFFICE O/P EST MOD 30 MIN: CPT | Performed by: INTERNAL MEDICINE

## 2019-09-09 RX ORDER — DEXTROAMPHETAMINE SULFATE, DEXTROAMPHETAMINE SACCHARATE, AMPHETAMINE SULFATE AND AMPHETAMINE ASPARTATE 5; 5; 5; 5 MG/1; MG/1; MG/1; MG/1
20 CAPSULE, EXTENDED RELEASE ORAL EVERY MORNING
Qty: 90 CAPSULE | Refills: 0 | Status: SHIPPED | OUTPATIENT
Start: 2019-09-09 | End: 2020-03-09

## 2019-09-09 NOTE — PROGRESS NOTES
Subjective   John Reis is a 59 y.o. male.  Presents with a chief complaint of uncomplicated ulcerative colitis, attention deficit disorder, ED that is well compensated, mild arthritic changes of both knees here for follow-up evaluation and treatment.    History of Present Illness in need of a 90-day supply of his attention deficit disorder medicine    The following portions of the patient's history were reviewed and updated as appropriate: allergies, current medications, past family history, past medical history, past social history, past surgical history and problem list.    Review of Systems   Musculoskeletal: Positive for arthralgias.   Psychiatric/Behavioral: Positive for decreased concentration.   All other systems reviewed and are negative.      Objective   Physical Exam   Constitutional: He appears well-developed and well-nourished.   HENT:   Head: Normocephalic and atraumatic.   Eyes: EOM are normal. Pupils are equal, round, and reactive to light.   Cardiovascular: Normal rate, regular rhythm and normal heart sounds.   Pulmonary/Chest: Effort normal and breath sounds normal.   Abdominal: Soft. Bowel sounds are normal.   Musculoskeletal:   Mild arthritic changes of both knees   Neurological: He is alert.   Skin: Skin is warm.   Nursing note and vitals reviewed.        Assessment/Plan   John was seen today for add.    Diagnoses and all orders for this visit:    Chronic ulcerative colitis without complication, unspecified location (CMS/[facility])  Comments:  Working with a gastroenterologist and may go on a biologic agent as early as next week    ED (erectile dysfunction) of organic origin  Comments:  Continue Levitra as needed    Attention deficit disorder (ADD) without hyperactivity  Comments:  Adderall XR 20 mg/day dispense a 90-day supply to his mail order facility    Chronic pain of both knees  Comments:  Appears to be doing better overall    Attention deficit disorder, unspecified hyperactivity  presence  -     ADDERALL XR 20 MG 24 hr capsule; Take 1 capsule by mouth Every Morning

## 2019-09-10 ENCOUNTER — TELEPHONE (OUTPATIENT)
Dept: INTERNAL MEDICINE | Facility: CLINIC | Age: 59
End: 2019-09-10

## 2019-09-10 NOTE — TELEPHONE ENCOUNTER
Bibi is calling from local [facility]. For ADDERALL XR 20 MG 24 hr capsule   States that because this is a short supply and duplicate to the mail order and addition to the mail order they will need a verbal for the two weeks worth that was hand written.      [phone]

## 2020-01-27 ENCOUNTER — CLINICAL SUPPORT (OUTPATIENT)
Dept: INTERNAL MEDICINE | Facility: CLINIC | Age: 60
End: 2020-01-27

## 2020-01-27 DIAGNOSIS — Z23 NEED FOR IMMUNIZATION AGAINST INFLUENZA: Primary | ICD-10-CM

## 2020-01-27 PROCEDURE — 90471 IMMUNIZATION ADMIN: CPT | Performed by: INTERNAL MEDICINE

## 2020-01-27 PROCEDURE — 90674 CCIIV4 VAC NO PRSV 0.5 ML IM: CPT | Performed by: INTERNAL MEDICINE

## 2020-03-09 ENCOUNTER — OFFICE VISIT (OUTPATIENT)
Dept: INTERNAL MEDICINE | Facility: CLINIC | Age: 60
End: 2020-03-09

## 2020-03-09 VITALS
OXYGEN SATURATION: 98 % | SYSTOLIC BLOOD PRESSURE: 110 MMHG | WEIGHT: 164 LBS | BODY MASS INDEX: 24.86 KG/M2 | DIASTOLIC BLOOD PRESSURE: 80 MMHG | HEIGHT: 68 IN | HEART RATE: 54 BPM

## 2020-03-09 DIAGNOSIS — M25.561 CHRONIC PAIN OF BOTH KNEES: ICD-10-CM

## 2020-03-09 DIAGNOSIS — N52.9 ED (ERECTILE DYSFUNCTION) OF ORGANIC ORIGIN: ICD-10-CM

## 2020-03-09 DIAGNOSIS — K51.90 CHRONIC ULCERATIVE COLITIS WITHOUT COMPLICATION, UNSPECIFIED LOCATION (HCC): ICD-10-CM

## 2020-03-09 DIAGNOSIS — G89.29 CHRONIC PAIN OF BOTH KNEES: ICD-10-CM

## 2020-03-09 DIAGNOSIS — F98.8 ATTENTION DEFICIT DISORDER (ADD) WITHOUT HYPERACTIVITY: Primary | ICD-10-CM

## 2020-03-09 DIAGNOSIS — F32.4 MAJOR DEPRESSIVE DISORDER WITH SINGLE EPISODE, IN PARTIAL REMISSION (HCC): ICD-10-CM

## 2020-03-09 DIAGNOSIS — M25.562 CHRONIC PAIN OF BOTH KNEES: ICD-10-CM

## 2020-03-09 DIAGNOSIS — M21.611 BUNION, RIGHT FOOT: ICD-10-CM

## 2020-03-09 PROCEDURE — 99214 OFFICE O/P EST MOD 30 MIN: CPT | Performed by: INTERNAL MEDICINE

## 2020-03-09 NOTE — PROGRESS NOTES
"Subjective   John Reis is a 59 y.o. male.  Patient presents with chief complaint of depression associated with very stressful divorce that he is undergoing currently, ADD, chronic ulcerative colitis which fortunately is doing exceptionally well, erectile dysfunctions well compensated for, chronic pain of both knees secondary to arthritis which fortunately recently has become substantially better, with bunions on both feet worse on the right side than the left and he has a strong family history of bunions and hammertoe difficulties and is requesting a podiatry follow-up.  Otherwise the patient is doing well on ocular check any labs today but I am going to refer him back to his gastroenterology contact Dr. Darnell.      /80   Pulse 54   Ht 172.7 cm (67.99\")   Wt 74.4 kg (164 lb)   SpO2 98%   BMI 24.94 kg/m²     Body mass index is 24.94 kg/m².    History of Present Illness doing well except for her stress associated with his ongoing divorce    The following portions of the patient's history were reviewed and updated as appropriate: allergies, current medications, past family history, past medical history, past social history, past surgical history and problem list.    Review of Systems   Constitutional: Negative.    HENT: Negative.    Respiratory: Negative.    Cardiovascular: Negative.    Gastrointestinal: Negative.    Genitourinary: Positive for erectile dysfunction.   Musculoskeletal: Positive for arthralgias.   Neurological: Negative.    Psychiatric/Behavioral: Negative.        Objective   Physical Exam   Constitutional: He is oriented to person, place, and time. He appears well-developed and well-nourished.   HENT:   Head: Normocephalic and atraumatic.   Cardiovascular: Normal rate, regular rhythm and normal heart sounds.   Pulmonary/Chest: Effort normal and breath sounds normal.   Abdominal: Soft. Bowel sounds are normal.   Musculoskeletal:   Arthropathy of both knees with a bunion of his right " first toe   Neurological: He is alert and oriented to person, place, and time.   Psychiatric: He has a normal mood and affect. His behavior is normal.   Nursing note and vitals reviewed.        Assessment/Plan   John was seen today for ulcerative colitis and add.    Diagnoses and all orders for this visit:    Attention deficit disorder (ADD) without hyperactivity  Comments:  not currently taking adderall    Major depressive disorder with single episode, in partial remission (CMS/[facility])  Comments:  doing well working with CBT    Chronic ulcerative colitis without complication, unspecified location (CMS/[facility])  Comments:  doing very well  Orders:  -     Ambulatory Referral to General Surgery    ED (erectile dysfunction) of organic origin  Comments:  well controlled    Chronic pain of both knees  Comments:  improved    Bunion, right foot  Comments:  podiatry follow up  Orders:  -     Ambulatory Referral to Podiatry

## 2020-05-27 ENCOUNTER — OFFICE VISIT (OUTPATIENT)
Dept: GASTROENTEROLOGY | Facility: CLINIC | Age: 60
End: 2020-05-27

## 2020-05-27 VITALS
HEIGHT: 68 IN | OXYGEN SATURATION: 99 % | TEMPERATURE: 97.7 F | HEART RATE: 57 BPM | SYSTOLIC BLOOD PRESSURE: 104 MMHG | DIASTOLIC BLOOD PRESSURE: 70 MMHG | WEIGHT: 165 LBS | RESPIRATION RATE: 16 BRPM | BODY MASS INDEX: 25.01 KG/M2

## 2020-05-27 DIAGNOSIS — K51.30 CHRONIC ULCERATIVE RECTOSIGMOIDITIS WITHOUT COMPLICATIONS (HCC): ICD-10-CM

## 2020-05-27 DIAGNOSIS — Z79.899 HIGH RISK MEDICATION USE: ICD-10-CM

## 2020-05-27 DIAGNOSIS — K51.30 ULCERATIVE RECTOSIGMOIDITIS WITHOUT COMPLICATION (HCC): Primary | ICD-10-CM

## 2020-05-27 PROCEDURE — 99213 OFFICE O/P EST LOW 20 MIN: CPT | Performed by: NURSE PRACTITIONER

## 2020-05-27 RX ORDER — AZATHIOPRINE 50 MG/1
TABLET ORAL
Qty: 270 TABLET | Refills: 3 | Status: ON HOLD | OUTPATIENT
Start: 2020-05-27 | End: 2021-04-23

## 2020-05-27 RX ORDER — MESALAMINE 0.38 G/1
CAPSULE, EXTENDED RELEASE ORAL
Qty: 360 CAPSULE | Refills: 3 | Status: ON HOLD | OUTPATIENT
Start: 2020-05-27 | End: 2021-06-07

## 2020-05-27 RX ORDER — CHOLECALCIFEROL (VITAMIN D3) 125 MCG
500 CAPSULE ORAL DAILY
COMMUNITY
End: 2020-09-15

## 2020-05-27 NOTE — PROGRESS NOTES
Follow-up (Imuran and Apriso refill)      HPI  60-year-old male presents the office today for follow-up.  He was a patient in our previous practice and was last seen in office on 9/11/2019.  He has a history of ulcerative colitis    His last colonoscopy was performed on 2/25/2019 and demonstrated granular and scarred mucosa from the sigmoid to rectum.  Colon biopsy and rectum biopsies were consistent with mildly active chronic colitis without granuloma, dysplasia, or viral cytopathic effect.  He continues azathioprine 150 mg daily and Apriso 4 tablets daily.  He reports that his current regimen is working well to manage symptoms.  He reports an average of 1 formed stool daily.  He denies any melena, hematochezia, diarrhea, constipation, or abdominal pain.  He reports having a good appetite and his weight is stable.  He is due for lab work today.    Review of Systems   Constitutional: Negative for appetite change, chills, diaphoresis, fatigue, fever and unexpected weight change.   HENT: Negative for dental problem, ear pain, mouth sores, rhinorrhea, sore throat and voice change.    Eyes: Negative for pain, redness and visual disturbance.   Respiratory: Negative for cough, chest tightness and wheezing.    Cardiovascular: Negative for chest pain, palpitations and leg swelling.   Endocrine: Negative for cold intolerance, heat intolerance, polydipsia, polyphagia and polyuria.   Genitourinary: Negative for dysuria, frequency, hematuria and urgency.   Musculoskeletal: Negative for arthralgias, back pain, joint swelling, myalgias and neck pain.   Skin: Negative for rash.   Allergic/Immunologic: Negative for environmental allergies, food allergies and immunocompromised state.   Neurological: Negative for dizziness, seizures, weakness, numbness and headaches.   Hematological: Does not bruise/bleed easily.   Psychiatric/Behavioral: Negative for sleep disturbance. The patient is not nervous/anxious.           Problem List:     Patient Active Problem List   Diagnosis   • ADD (attention deficit disorder)   • Depression   • ED (erectile dysfunction) of organic origin   • Herpes zoster dermatitis   • S/P inguinal hernia repair using synthetic patch   • Testicular pain, right   • Ulcerative colitis, chronic (CMS/HCC)   • Chronic pain of both knees   • Left hip pain   • Primary osteoarthritis of left hip   • Status post left hip replacement   • Ganglion cyst   • Tinea of nail   • Bunion, right foot       Medical History:    Past Medical History:   Diagnosis Date   • ADHD (attention deficit hyperactivity disorder)    • Age-related osteopor w/curr pathol fx right lower leg w/routine heal    • Depression 9/2/2015   • Herpes zoster dermatitis 4/27/2016   • Hyperlipidemia    • Primary osteoarthritis of left hip 3/29/2018   • Ulcerative colitis (CMS/HCC)         Social History:    Social History     Socioeconomic History   • Marital status:      Spouse name: Not on file   • Number of children: Not on file   • Years of education: Not on file   • Highest education level: Not on file   Tobacco Use   • Smoking status: Never Smoker   • Smokeless tobacco: Never Used   Substance and Sexual Activity   • Alcohol use: Yes   • Drug use: Defer   • Sexual activity: Defer       Family History:   Family History   Problem Relation Age of Onset   • Diabetes Father    • Breast cancer Sister    • Diabetes Brother    • Hypertension Brother    • Heart attack Sister    • Colon polyps Mother    • Colon cancer Neg Hx        Surgical History:   Past Surgical History:   Procedure Laterality Date   • HERNIA REPAIR     • KNEE SURGERY     • SHOULDER ARTHROSCOPY           Current Outpatient Medications:   •  azaTHIOprine (IMURAN) 50 MG tablet, Take 3 tabs daily, Disp: 270 tablet, Rfl: 3  •  mesalamine (APRISO) 0.375 g 24 hr capsule, Take 4 capsules daily, Disp: 360 capsule, Rfl: 3  •  vitamin B-12 (CYANOCOBALAMIN) 500 MCG tablet, Take 500 mcg by mouth Daily., Disp: ,  Rfl:   •  Cholecalciferol (VITAMIN D3) 5000 UNITS capsule capsule, Take 5,000 Units by mouth daily., Disp: , Rfl:   •  ketoconazole (NIZORAL) 200 MG tablet, Take 1 tablet by mouth Daily., Disp: 30 tablet, Rfl: 3  •  vardenafil (LEVITRA) 20 MG tablet, TAKE ONE-HALF TO ONE TABLET BY MOUTH 1 HOUR PRIOR TO INTERCOURSE AS NEEDED, Disp: , Rfl:     Allergies:   Allergies   Allergen Reactions   • Nsaids Other (See Comments)     Due to hx ulcerative colitis        The following portions of the patient's history were reviewed and updated as appropriate: allergies, current medications, past family history, past medical history, past social history, past surgical history and problem list.    Vitals:    05/27/20 1446   BP: 104/70   Pulse: 57   Resp: 16   Temp: 97.7 °F (36.5 °C)   SpO2: 99%       Physical Exam   Constitutional: He is oriented to person, place, and time. He appears well-developed and well-nourished.   Pulmonary/Chest: Effort normal. No respiratory distress.   Abdominal: Soft. Bowel sounds are normal. He exhibits no distension and no mass. There is no tenderness. There is no guarding.   Musculoskeletal: Normal range of motion.   Neurological: He is alert and oriented to person, place, and time.   Skin: Skin is warm and dry.   Psychiatric: He has a normal mood and affect. His behavior is normal. Judgment and thought content normal.   Vitals reviewed.      Assessment/ Plan  John was seen today for follow-up.    Diagnoses and all orders for this visit:    Ulcerative rectosigmoiditis without complication (CMS/HCC)  -     CBC & Differential  -     Hepatic Function Panel    High risk medication use  -     CBC & Differential  -     Hepatic Function Panel    Chronic ulcerative rectosigmoiditis without complications (CMS/HCC)    Other orders  -     mesalamine (APRISO) 0.375 g 24 hr capsule; Take 4 capsules daily  -     azaTHIOprine (IMURAN) 50 MG tablet; Take 3 tabs daily         Return in about 6 months (around  11/27/2020).      Discussion:  Patient is doing well on current regimen with azathioprine 150 mg daily and Apriso 4 tablets daily.  We will continue this regimen and have sent in refills to the patient's pharmacy.  We will need to obtain some routine blood work today including a CBC and hepatic function panel for ongoing monitoring of high-risk medication use.  Lab work will need to be performed every 3 months for reassessment.     Due to use of an immunomodulator, patient was advised to adhere to COVID-19 precautions regarding use of mask, avoiding crowds, rigorous handwashing, and practicing of social distancing.  Plan for next office follow-up in 6 months for reassessment of symptoms.  All questions answered and support provided.

## 2020-05-27 NOTE — PATIENT INSTRUCTIONS
1.  For ulcerative colitis, continue azathioprine 3 tablets daily and Apriso 4 capsules daily.  Refills have been sent for both medications to [facility].    2.  For high risk medication use, we will obtain a CBC and hepatic function panel today.  We will contact you with results once available.    3.  Recommend office follow-up in 6 months for reassessment of symptoms.

## 2020-05-28 LAB
ALBUMIN SERPL-MCNC: 4.6 G/DL (ref 3.5–5.2)
ALP SERPL-CCNC: 48 U/L (ref 39–117)
ALT SERPL-CCNC: 15 U/L (ref 1–41)
AST SERPL-CCNC: 26 U/L (ref 1–40)
BASOPHILS # BLD AUTO: 0.05 10*3/MM3 (ref 0–0.2)
BASOPHILS NFR BLD AUTO: 1.1 % (ref 0–1.5)
BILIRUB DIRECT SERPL-MCNC: <0.2 MG/DL (ref 0.2–0.3)
BILIRUB SERPL-MCNC: 0.5 MG/DL (ref 0.2–1.2)
EOSINOPHIL # BLD AUTO: 0.21 10*3/MM3 (ref 0–0.4)
EOSINOPHIL NFR BLD AUTO: 4.7 % (ref 0.3–6.2)
ERYTHROCYTE [DISTWIDTH] IN BLOOD BY AUTOMATED COUNT: 14.2 % (ref 12.3–15.4)
HCT VFR BLD AUTO: 40.7 % (ref 37.5–51)
HGB BLD-MCNC: 14.1 G/DL (ref 13–17.7)
IMM GRANULOCYTES # BLD AUTO: 0.01 10*3/MM3 (ref 0–0.05)
IMM GRANULOCYTES NFR BLD AUTO: 0.2 % (ref 0–0.5)
LYMPHOCYTES # BLD AUTO: 1.1 10*3/MM3 (ref 0.7–3.1)
LYMPHOCYTES NFR BLD AUTO: 24.4 % (ref 19.6–45.3)
MCH RBC QN AUTO: 33.7 PG (ref 26.6–33)
MCHC RBC AUTO-ENTMCNC: 34.6 G/DL (ref 31.5–35.7)
MCV RBC AUTO: 97.4 FL (ref 79–97)
MONOCYTES # BLD AUTO: 0.31 10*3/MM3 (ref 0.1–0.9)
MONOCYTES NFR BLD AUTO: 6.9 % (ref 5–12)
NEUTROPHILS # BLD AUTO: 2.82 10*3/MM3 (ref 1.7–7)
NEUTROPHILS NFR BLD AUTO: 62.7 % (ref 42.7–76)
NRBC BLD AUTO-RTO: 0 /100 WBC (ref 0–0.2)
PLATELET # BLD AUTO: 216 10*3/MM3 (ref 140–450)
PROT SERPL-MCNC: 7 G/DL (ref 6–8.5)
RBC # BLD AUTO: 4.18 10*6/MM3 (ref 4.14–5.8)
WBC # BLD AUTO: 4.5 10*3/MM3 (ref 3.4–10.8)

## 2020-06-01 DIAGNOSIS — K51.30 ULCERATIVE RECTOSIGMOIDITIS WITHOUT COMPLICATION (HCC): Primary | ICD-10-CM

## 2020-06-01 DIAGNOSIS — Z79.899 HIGH RISK MEDICATION USE: ICD-10-CM

## 2020-08-27 ENCOUNTER — RESULTS ENCOUNTER (OUTPATIENT)
Dept: GASTROENTEROLOGY | Facility: CLINIC | Age: 60
End: 2020-08-27

## 2020-08-27 DIAGNOSIS — Z79.899 HIGH RISK MEDICATION USE: ICD-10-CM

## 2020-08-27 DIAGNOSIS — K51.30 ULCERATIVE RECTOSIGMOIDITIS WITHOUT COMPLICATION (HCC): ICD-10-CM

## 2020-09-15 ENCOUNTER — OFFICE VISIT (OUTPATIENT)
Dept: INTERNAL MEDICINE | Facility: CLINIC | Age: 60
End: 2020-09-15

## 2020-09-15 VITALS
HEART RATE: 57 BPM | DIASTOLIC BLOOD PRESSURE: 78 MMHG | OXYGEN SATURATION: 98 % | WEIGHT: 163 LBS | SYSTOLIC BLOOD PRESSURE: 104 MMHG | BODY MASS INDEX: 24.71 KG/M2 | HEIGHT: 68 IN | TEMPERATURE: 97.9 F

## 2020-09-15 DIAGNOSIS — N52.9 ED (ERECTILE DYSFUNCTION) OF ORGANIC ORIGIN: ICD-10-CM

## 2020-09-15 DIAGNOSIS — Z23 NEED FOR VACCINATION: ICD-10-CM

## 2020-09-15 DIAGNOSIS — F32.4 MAJOR DEPRESSIVE DISORDER WITH SINGLE EPISODE, IN PARTIAL REMISSION (HCC): ICD-10-CM

## 2020-09-15 DIAGNOSIS — K51.30 CHRONIC ULCERATIVE RECTOSIGMOIDITIS WITHOUT COMPLICATIONS (HCC): Primary | ICD-10-CM

## 2020-09-15 DIAGNOSIS — F98.8 ATTENTION DEFICIT DISORDER (ADD) WITHOUT HYPERACTIVITY: ICD-10-CM

## 2020-09-15 DIAGNOSIS — Z96.642 STATUS POST LEFT HIP REPLACEMENT: ICD-10-CM

## 2020-09-15 LAB
BASOPHILS # BLD AUTO: 0.06 10*3/MM3 (ref 0–0.2)
BASOPHILS NFR BLD AUTO: 1.3 % (ref 0–1.5)
EOSINOPHIL # BLD AUTO: 0.27 10*3/MM3 (ref 0–0.4)
EOSINOPHIL NFR BLD AUTO: 5.7 % (ref 0.3–6.2)
ERYTHROCYTE [DISTWIDTH] IN BLOOD BY AUTOMATED COUNT: 13.8 % (ref 12.3–15.4)
HCT VFR BLD AUTO: 40.6 % (ref 37.5–51)
HGB BLD-MCNC: 14.2 G/DL (ref 13–17.7)
IMM GRANULOCYTES # BLD AUTO: 0.01 10*3/MM3 (ref 0–0.05)
IMM GRANULOCYTES NFR BLD AUTO: 0.2 % (ref 0–0.5)
LYMPHOCYTES # BLD AUTO: 1.24 10*3/MM3 (ref 0.7–3.1)
LYMPHOCYTES NFR BLD AUTO: 26.1 % (ref 19.6–45.3)
MCH RBC QN AUTO: 34 PG (ref 26.6–33)
MCHC RBC AUTO-ENTMCNC: 35 G/DL (ref 31.5–35.7)
MCV RBC AUTO: 97.1 FL (ref 79–97)
MONOCYTES # BLD AUTO: 0.34 10*3/MM3 (ref 0.1–0.9)
MONOCYTES NFR BLD AUTO: 7.2 % (ref 5–12)
NEUTROPHILS # BLD AUTO: 2.83 10*3/MM3 (ref 1.7–7)
NEUTROPHILS NFR BLD AUTO: 59.5 % (ref 42.7–76)
NRBC BLD AUTO-RTO: 0 /100 WBC (ref 0–0.2)
PLATELET # BLD AUTO: 216 10*3/MM3 (ref 140–450)
RBC # BLD AUTO: 4.18 10*6/MM3 (ref 4.14–5.8)
WBC # BLD AUTO: 4.75 10*3/MM3 (ref 3.4–10.8)

## 2020-09-15 PROCEDURE — 90686 IIV4 VACC NO PRSV 0.5 ML IM: CPT | Performed by: INTERNAL MEDICINE

## 2020-09-15 PROCEDURE — 99214 OFFICE O/P EST MOD 30 MIN: CPT | Performed by: INTERNAL MEDICINE

## 2020-09-15 PROCEDURE — 90471 IMMUNIZATION ADMIN: CPT | Performed by: INTERNAL MEDICINE

## 2020-09-15 NOTE — PROGRESS NOTES
"Subjective   John Reis is a 60 y.o. male.       /78   Pulse 57   Temp 97.9 °F (36.6 °C)   Ht 172.7 cm (67.99\")   Wt 73.9 kg (163 lb)   SpO2 98%   BMI 24.79 kg/m²     Body mass index is 24.79 kg/m².    History of Present Illness doing well overall, some difficulty with right knee discomfort    The following portions of the patient's history were reviewed and updated as appropriate: allergies, current medications, past family history, past medical history, past social history, past surgical history and problem list.    Review of Systems   Constitutional: Negative.    HENT: Negative.    Respiratory: Negative.    Cardiovascular: Negative.    Gastrointestinal: Negative.    Genitourinary: Positive for erectile dysfunction.   Musculoskeletal: Positive for arthralgias.   Psychiatric/Behavioral: Negative.        Objective   Physical Exam  Vitals signs and nursing note reviewed.   Constitutional:       Appearance: Normal appearance.   HENT:      Head: Normocephalic and atraumatic.   Neck:      Musculoskeletal: Normal range of motion and neck supple.   Cardiovascular:      Rate and Rhythm: Normal rate and regular rhythm.   Pulmonary:      Effort: Pulmonary effort is normal.   Abdominal:      General: Abdomen is flat.      Palpations: Abdomen is soft.   Musculoskeletal:      Comments: S/p left THR with right knee arthritis   Neurological:      General: No focal deficit present.      Mental Status: He is alert and oriented to person, place, and time.           Assessment/Plan   John was seen today for add and ulcerative colitis.    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

## 2020-11-12 DIAGNOSIS — K51.30 CHRONIC ULCERATIVE RECTOSIGMOIDITIS WITHOUT COMPLICATIONS (HCC): ICD-10-CM

## 2020-11-12 DIAGNOSIS — Z79.899 HIGH RISK MEDICATION USE: Primary | ICD-10-CM

## 2020-11-12 LAB
ALBUMIN SERPL-MCNC: 4.6 G/DL (ref 3.8–4.9)
ALP SERPL-CCNC: 61 IU/L (ref 39–117)
ALT SERPL-CCNC: 10 IU/L (ref 0–44)
AST SERPL-CCNC: 23 IU/L (ref 0–40)
BASOPHILS # BLD AUTO: 0.1 X10E3/UL (ref 0–0.2)
BASOPHILS NFR BLD AUTO: 1 %
BILIRUB DIRECT SERPL-MCNC: 0.18 MG/DL (ref 0–0.4)
BILIRUB SERPL-MCNC: 0.9 MG/DL (ref 0–1.2)
EOSINOPHIL # BLD AUTO: 0.2 X10E3/UL (ref 0–0.4)
EOSINOPHIL NFR BLD AUTO: 4 %
ERYTHROCYTE [DISTWIDTH] IN BLOOD BY AUTOMATED COUNT: 13.6 % (ref 11.6–15.4)
HCT VFR BLD AUTO: 43.5 % (ref 37.5–51)
HGB BLD-MCNC: 15.4 G/DL (ref 13–17.7)
IMM GRANULOCYTES # BLD AUTO: 0 X10E3/UL (ref 0–0.1)
IMM GRANULOCYTES NFR BLD AUTO: 0 %
LYMPHOCYTES # BLD AUTO: 1.5 X10E3/UL (ref 0.7–3.1)
LYMPHOCYTES NFR BLD AUTO: 27 %
MCH RBC QN AUTO: 34.7 PG (ref 26.6–33)
MCHC RBC AUTO-ENTMCNC: 35.4 G/DL (ref 31.5–35.7)
MCV RBC AUTO: 98 FL (ref 79–97)
MONOCYTES # BLD AUTO: 0.4 X10E3/UL (ref 0.1–0.9)
MONOCYTES NFR BLD AUTO: 8 %
NEUTROPHILS # BLD AUTO: 3.4 X10E3/UL (ref 1.4–7)
NEUTROPHILS NFR BLD AUTO: 60 %
PLATELET # BLD AUTO: 247 X10E3/UL (ref 150–450)
PROT SERPL-MCNC: 7.3 G/DL (ref 6–8.5)
RBC # BLD AUTO: 4.44 X10E6/UL (ref 4.14–5.8)
WBC # BLD AUTO: 5.7 X10E3/UL (ref 3.4–10.8)

## 2020-11-19 ENCOUNTER — OFFICE VISIT (OUTPATIENT)
Dept: GASTROENTEROLOGY | Facility: CLINIC | Age: 60
End: 2020-11-19

## 2020-11-19 ENCOUNTER — PREP FOR SURGERY (OUTPATIENT)
Dept: OTHER | Facility: HOSPITAL | Age: 60
End: 2020-11-19

## 2020-11-19 VITALS
RESPIRATION RATE: 16 BRPM | HEART RATE: 51 BPM | HEIGHT: 68 IN | SYSTOLIC BLOOD PRESSURE: 106 MMHG | DIASTOLIC BLOOD PRESSURE: 76 MMHG | TEMPERATURE: 97.5 F | BODY MASS INDEX: 24.07 KG/M2 | OXYGEN SATURATION: 100 % | WEIGHT: 158.8 LBS

## 2020-11-19 DIAGNOSIS — K51.919 ULCERATIVE COLITIS WITH COMPLICATION, UNSPECIFIED LOCATION (HCC): Primary | ICD-10-CM

## 2020-11-19 DIAGNOSIS — Z12.11 COLON CANCER SCREENING: ICD-10-CM

## 2020-11-19 DIAGNOSIS — Z79.899 HIGH RISK MEDICATION USE: ICD-10-CM

## 2020-11-19 PROCEDURE — 99214 OFFICE O/P EST MOD 30 MIN: CPT | Performed by: INTERNAL MEDICINE

## 2020-11-19 NOTE — PATIENT INSTRUCTIONS
1. For ulcerative colitis, continue Apriso 4 tabs daily and azathioprine 3 tabs daily.    2. Continue Canasa suppositories nightly.,    3. For surveillance of ulcerative colitis, we will plan for your next colonoscopy February 2021.     4. Plan for next CBC and hepatic function panel in 3 months, which will be due February 2021.

## 2020-11-19 NOTE — PROGRESS NOTES
Ulcerative Colitis      HPI  60-year old male presents today for follow up for ulcerative colitis. He continues azathioprine 150 mg daily. He continues Apriso 4 tabs daily. He restarted use of Canasa suppositories nightly.     Last colonoscopy was performed on 2/25/2019 and demonstrated granular, scarred mucosa sigmoid to rectum. Recent lab work November was normal. He reports that symptoms are well controlled. He reports regular BMs. He denies any blood in his stool. He denies any abdominal pain. He is experimenting with his diet somewhat with intermittent fasting.     Review of Systems   Constitutional: Negative for appetite change, chills, diaphoresis, fatigue, fever and unexpected weight change.   HENT: Negative for dental problem, ear pain, mouth sores, rhinorrhea, sore throat and voice change.    Eyes: Negative for pain, redness and visual disturbance.   Respiratory: Negative for cough, chest tightness and wheezing.    Cardiovascular: Negative for chest pain, palpitations and leg swelling.   Endocrine: Negative for cold intolerance, heat intolerance, polydipsia, polyphagia and polyuria.   Genitourinary: Negative for dysuria, frequency, hematuria and urgency.   Musculoskeletal: Negative for arthralgias, back pain, joint swelling, myalgias and neck pain.   Skin: Negative for rash.   Allergic/Immunologic: Negative for environmental allergies, food allergies and immunocompromised state.   Neurological: Negative for dizziness, seizures, weakness, numbness and headaches.   Hematological: Does not bruise/bleed easily.   Psychiatric/Behavioral: Negative for sleep disturbance. The patient is not nervous/anxious.         I have reviewed and confirmed the accuracy of the HPI and ROS as documented by the APRN LANG Miller     Problem List:    Patient Active Problem List   Diagnosis   • ADD (attention deficit disorder)   • Depression   • ED (erectile dysfunction) of organic origin   • Herpes zoster dermatitis   •  S/P inguinal hernia repair using synthetic patch   • Testicular pain, right   • Ulcerative colitis, chronic (CMS/HCC)   • Chronic pain of both knees   • Left hip pain   • Primary osteoarthritis of left hip   • Status post left hip replacement   • Ganglion cyst   • Tinea of nail   • Bunion, right foot       Medical History:    Past Medical History:   Diagnosis Date   • ADHD (attention deficit hyperactivity disorder)    • Age-related osteopor w/curr pathol fx right lower leg w/routine heal    • Bloating    • Colitis    • Depression 9/2/2015   • Family history of colonic polyps    • Herpes zoster dermatitis 4/27/2016   • High risk medication use    • Hyperkalemia    • Hyperlipidemia    • Internal hemorrhoids    • Primary osteoarthritis of left hip 3/29/2018   • Tinnitus of both ears    • Ulcerative colitis (CMS/HCC)    • Ulcerative colitis (CMS/HCC)         Social History:    Social History     Socioeconomic History   • Marital status:      Spouse name: Not on file   • Number of children: Not on file   • Years of education: Not on file   • Highest education level: Not on file   Tobacco Use   • Smoking status: Never Smoker   • Smokeless tobacco: Never Used   Substance and Sexual Activity   • Alcohol use: Yes   • Drug use: Defer   • Sexual activity: Defer       Family History:   Family History   Problem Relation Age of Onset   • Diabetes Father    • Breast cancer Sister    • Diabetes Brother    • Hypertension Brother    • Heart attack Sister    • Colon polyps Mother    • Colonic polyp Other    • Colon cancer Neg Hx        Surgical History:   Past Surgical History:   Procedure Laterality Date   • COLONOSCOPY      colitis per patient   • HERNIA REPAIR      double inguinal hernia repair    • KNEE SURGERY  06/2014 June 2014 Right medial meniscus.   • SHOULDER ARTHROSCOPY           Current Outpatient Medications:   •  azaTHIOprine (IMURAN) 50 MG tablet, Take 3 tabs daily, Disp: 270 tablet, Rfl: 3  •  mesalamine  (APRISO) 0.375 g 24 hr capsule, Take 4 capsules daily, Disp: 360 capsule, Rfl: 3  •  vardenafil (LEVITRA) 20 MG tablet, TAKE ONE-HALF TO ONE TABLET BY MOUTH 1 HOUR PRIOR TO INTERCOURSE AS NEEDED, Disp: , Rfl:     Allergies:   Allergies   Allergen Reactions   • Nsaids Other (See Comments)     Due to hx ulcerative colitis        The following portions of the patient's history were reviewed and updated as appropriate: allergies, current medications, past family history, past medical history, past social history, past surgical history and problem list.    Vitals:    11/19/20 1451   BP: 106/76   Pulse: 51   Resp: 16   Temp: 97.5 °F (36.4 °C)   SpO2: 100%         11/19/20  1451   Weight: 72 kg (158 lb 12.8 oz)     Body mass index is 24.15 kg/m².      Physical Exam  Vitals signs reviewed.   Constitutional:       Appearance: Normal appearance.   HENT:      Nose: No nasal deformity.   Eyes:      General: No scleral icterus.  Neck:      Comments: Trachea midline.  Cardiovascular:      Rate and Rhythm: Normal rate and regular rhythm.   Pulmonary:      Effort: No respiratory distress.      Breath sounds: Normal breath sounds.   Abdominal:      General: Bowel sounds are normal. There is no distension.      Palpations: Abdomen is soft. There is no mass.      Tenderness: There is no abdominal tenderness.   Lymphadenopathy:      Comments: No periumbilical lymphadenopathy.   Skin:     General: Skin is warm.   Neurological:      Mental Status: He is alert.          Assessment/ Plan  Diagnoses and all orders for this visit:    1. Ulcerative colitis with complication, unspecified location (CMS/HCC) (Primary)    2. High risk medication use    Other orders  -     CBC & Differential; Future  -     Hepatic Function Panel; Future         No follow-ups on file.    Patient Instructions   1. For ulcerative colitis, continue Apriso 4 tabs daily and azathioprine 3 tabs daily.    2. Continue Canasa suppositories nightly.,    3. For surveillance  of ulcerative colitis, we will plan for your next colonoscopy February 2021.     4. Plan for next CBC and hepatic function panel in 3 months, which will be due February 2021.       Documentation by Angelica CROFT acting as a scribe in the following sections (HPI, Assessment, Plan) for the undersigned provider.     Discussion:  Discussed potential risk factors for use of azathioprine in older age. Discussion was held with possible use of Entyvio in the future for management of UC. We will plan to proceed with colonoscopy February 2021 for ongoing monitoring of UC.

## 2020-12-04 RX ORDER — MESALAMINE 1000 MG/1
1000 SUPPOSITORY RECTAL NIGHTLY
Qty: 30 SUPPOSITORY | Refills: 5 | Status: SHIPPED | OUTPATIENT
Start: 2020-12-04 | End: 2022-03-14

## 2020-12-04 NOTE — TELEPHONE ENCOUNTER
Patient would like a refill for Mesalamine suppository,   Please advise     Last ov 11/19/2020  Ok to refill ?

## 2021-02-03 ENCOUNTER — PREP FOR SURGERY (OUTPATIENT)
Dept: SURGERY | Facility: SURGERY CENTER | Age: 61
End: 2021-02-03

## 2021-02-03 DIAGNOSIS — K51.919 ULCERATIVE COLITIS WITH COMPLICATION, UNSPECIFIED LOCATION (HCC): Primary | ICD-10-CM

## 2021-02-03 RX ORDER — SODIUM CHLORIDE, SODIUM LACTATE, POTASSIUM CHLORIDE, CALCIUM CHLORIDE 600; 310; 30; 20 MG/100ML; MG/100ML; MG/100ML; MG/100ML
30 INJECTION, SOLUTION INTRAVENOUS CONTINUOUS PRN
Status: CANCELLED | OUTPATIENT
Start: 2021-02-03

## 2021-02-03 RX ORDER — SODIUM CHLORIDE 0.9 % (FLUSH) 0.9 %
10 SYRINGE (ML) INJECTION AS NEEDED
Status: CANCELLED | OUTPATIENT
Start: 2021-02-03

## 2021-02-03 RX ORDER — SODIUM CHLORIDE 0.9 % (FLUSH) 0.9 %
3 SYRINGE (ML) INJECTION EVERY 12 HOURS SCHEDULED
Status: CANCELLED | OUTPATIENT
Start: 2021-02-03

## 2021-02-12 ENCOUNTER — TRANSCRIBE ORDERS (OUTPATIENT)
Dept: LAB | Facility: SURGERY CENTER | Age: 61
End: 2021-02-12

## 2021-02-12 DIAGNOSIS — Z01.818 OTHER SPECIFIED PRE-OPERATIVE EXAMINATION: Primary | ICD-10-CM

## 2021-02-25 ENCOUNTER — TELEPHONE (OUTPATIENT)
Dept: GASTROENTEROLOGY | Facility: CLINIC | Age: 61
End: 2021-02-25

## 2021-02-26 ENCOUNTER — LAB (OUTPATIENT)
Dept: LAB | Facility: SURGERY CENTER | Age: 61
End: 2021-02-26

## 2021-02-26 DIAGNOSIS — Z01.818 OTHER SPECIFIED PRE-OPERATIVE EXAMINATION: ICD-10-CM

## 2021-02-26 LAB — SARS-COV-2 ORF1AB RESP QL NAA+PROBE: NOT DETECTED

## 2021-02-26 PROCEDURE — U0004 COV-19 TEST NON-CDC HGH THRU: HCPCS | Performed by: SURGERY

## 2021-02-26 PROCEDURE — C9803 HOPD COVID-19 SPEC COLLECT: HCPCS

## 2021-03-01 ENCOUNTER — HOSPITAL ENCOUNTER (OUTPATIENT)
Facility: SURGERY CENTER | Age: 61
Setting detail: HOSPITAL OUTPATIENT SURGERY
Discharge: HOME | End: 2021-03-01
Attending: INTERNAL MEDICINE | Admitting: INTERNAL MEDICINE

## 2021-03-01 ENCOUNTER — ANESTHESIA EVENT (OUTPATIENT)
Dept: SURGERY | Facility: SURGERY CENTER | Age: 61
End: 2021-03-01

## 2021-03-01 ENCOUNTER — ANESTHESIA (OUTPATIENT)
Dept: SURGERY | Facility: SURGERY CENTER | Age: 61
End: 2021-03-01

## 2021-03-01 VITALS
SYSTOLIC BLOOD PRESSURE: 115 MMHG | WEIGHT: 157.2 LBS | DIASTOLIC BLOOD PRESSURE: 88 MMHG | HEIGHT: 68 IN | BODY MASS INDEX: 23.82 KG/M2 | TEMPERATURE: 98.4 F | OXYGEN SATURATION: 99 % | HEART RATE: 51 BPM | RESPIRATION RATE: 16 BRPM

## 2021-03-01 DIAGNOSIS — K51.919 MODERATE CHRONIC ULCERATIVE COLITIS WITH COMPLICATION (HCC): ICD-10-CM

## 2021-03-01 DIAGNOSIS — Z12.11 SPECIAL SCREENING FOR MALIGNANT NEOPLASMS, COLON: ICD-10-CM

## 2021-03-01 DIAGNOSIS — K51.919 ULCERATIVE COLITIS WITH COMPLICATION, UNSPECIFIED LOCATION (HCC): ICD-10-CM

## 2021-03-01 PROCEDURE — 25010000002 PROPOFOL 10 MG/ML EMULSION: Performed by: ANESTHESIOLOGY

## 2021-03-01 PROCEDURE — 88305 TISSUE EXAM BY PATHOLOGIST: CPT | Performed by: INTERNAL MEDICINE

## 2021-03-01 PROCEDURE — 45380 COLONOSCOPY AND BIOPSY: CPT | Performed by: INTERNAL MEDICINE

## 2021-03-01 RX ORDER — LIDOCAINE HYDROCHLORIDE 20 MG/ML
INJECTION, SOLUTION INFILTRATION; PERINEURAL AS NEEDED
Status: DISCONTINUED | OUTPATIENT
Start: 2021-03-01 | End: 2021-03-01 | Stop reason: SURG

## 2021-03-01 RX ORDER — SODIUM CHLORIDE 0.9 % (FLUSH) 0.9 %
10 SYRINGE (ML) INJECTION AS NEEDED
Status: DISCONTINUED | OUTPATIENT
Start: 2021-03-01 | End: 2021-03-01 | Stop reason: HOSPADM

## 2021-03-01 RX ORDER — LIDOCAINE HYDROCHLORIDE 10 MG/ML
0.5 INJECTION, SOLUTION INFILTRATION; PERINEURAL ONCE AS NEEDED
Status: DISCONTINUED | OUTPATIENT
Start: 2021-03-01 | End: 2021-03-01 | Stop reason: HOSPADM

## 2021-03-01 RX ORDER — MAGNESIUM HYDROXIDE 1200 MG/15ML
LIQUID ORAL AS NEEDED
Status: DISCONTINUED | OUTPATIENT
Start: 2021-03-01 | End: 2021-03-01 | Stop reason: HOSPADM

## 2021-03-01 RX ORDER — SODIUM CHLORIDE, SODIUM LACTATE, POTASSIUM CHLORIDE, CALCIUM CHLORIDE 600; 310; 30; 20 MG/100ML; MG/100ML; MG/100ML; MG/100ML
1000 INJECTION, SOLUTION INTRAVENOUS CONTINUOUS
Status: DISCONTINUED | OUTPATIENT
Start: 2021-03-01 | End: 2021-03-02 | Stop reason: HOSPADM

## 2021-03-01 RX ORDER — SODIUM CHLORIDE, SODIUM LACTATE, POTASSIUM CHLORIDE, CALCIUM CHLORIDE 600; 310; 30; 20 MG/100ML; MG/100ML; MG/100ML; MG/100ML
30 INJECTION, SOLUTION INTRAVENOUS CONTINUOUS PRN
Status: DISCONTINUED | OUTPATIENT
Start: 2021-03-01 | End: 2021-06-17 | Stop reason: HOSPADM

## 2021-03-01 RX ORDER — SODIUM CHLORIDE 0.9 % (FLUSH) 0.9 %
3 SYRINGE (ML) INJECTION EVERY 12 HOURS SCHEDULED
Status: DISCONTINUED | OUTPATIENT
Start: 2021-03-01 | End: 2021-03-01 | Stop reason: HOSPADM

## 2021-03-01 RX ORDER — MESALAMINE 4 G/60ML
4 ENEMA RECTAL SEE ADMIN INSTRUCTIONS
Qty: 30 ENEMA | Refills: 3 | Status: SHIPPED | OUTPATIENT
Start: 2021-03-01 | End: 2021-05-17

## 2021-03-01 RX ORDER — PROPOFOL 10 MG/ML
VIAL (ML) INTRAVENOUS AS NEEDED
Status: DISCONTINUED | OUTPATIENT
Start: 2021-03-01 | End: 2021-03-01 | Stop reason: SURG

## 2021-03-01 RX ADMIN — PROPOFOL 140 MCG/KG/MIN: 10 INJECTION, EMULSION INTRAVENOUS at 08:14

## 2021-03-01 RX ADMIN — LIDOCAINE HYDROCHLORIDE 60 MG: 20 INJECTION, SOLUTION INFILTRATION; PERINEURAL at 08:14

## 2021-03-01 RX ADMIN — PROPOFOL 100 MG: 10 INJECTION, EMULSION INTRAVENOUS at 08:14

## 2021-03-01 RX ADMIN — SODIUM CHLORIDE, POTASSIUM CHLORIDE, SODIUM LACTATE AND CALCIUM CHLORIDE 1000 ML: 600; 310; 30; 20 INJECTION, SOLUTION INTRAVENOUS at 06:56

## 2021-03-01 NOTE — ANESTHESIA PREPROCEDURE EVALUATION
Anesthesia Evaluation     Patient summary reviewed and Nursing notes reviewed   NPO Solid Status: > 8 hours  NPO Liquid Status: > 2 hours           Airway   Mallampati: II  TM distance: >3 FB  Neck ROM: full  No difficulty expected  Dental - normal exam     Pulmonary - negative pulmonary ROS and normal exam   Cardiovascular - normal exam  Exercise tolerance: good (4-7 METS)    (+) hyperlipidemia,       Neuro/Psych  (+) psychiatric history Anxiety and Depression,     GI/Hepatic/Renal/Endo - negative ROS     Musculoskeletal     Abdominal    Substance History - negative use     OB/GYN          Other   arthritis,                      Anesthesia Plan    ASA 2     MAC     intravenous induction     Anesthetic plan, all risks, benefits, and alternatives have been provided, discussed and informed consent has been obtained with: patient.

## 2021-03-01 NOTE — ANESTHESIA POSTPROCEDURE EVALUATION
"Patient: John Reis    Procedure Summary     Date: 03/01/21 Room / Location: [facility] / [facility]    Anesthesia Start: 0758 Anesthesia Stop: 0835    Procedure: COLONOSCOPY (N/A ) Diagnosis:       Moderate chronic ulcerative colitis with complication (CMS/HCC)      Special screening for malignant neoplasms, colon      (Moderate chronic ulcerative colitis with complication (CMS/HCC) [K51.919])      (Special screening for malignant neoplasms, colon [Z12.11])    Surgeon: Saroj Darnell MD Provider: Juan Diego MD    Anesthesia Type: MAC ASA Status: 2          Anesthesia Type: MAC    Vitals  Vitals Value Taken Time   /65 03/01/21 0832   Temp     Pulse 62 03/01/21 0836   Resp     SpO2 97 % 03/01/21 0836   Vitals shown include unvalidated device data.        Post Anesthesia Care and Evaluation    Patient location during evaluation: bedside  Patient participation: complete - patient participated  Level of consciousness: awake and alert  Pain score: 0  Pain management: adequate  Airway patency: patent  Anesthetic complications: No anesthetic complications  PONV Status: none  Cardiovascular status: acceptable and hemodynamically stable  Respiratory status: acceptable and spontaneous ventilation  Hydration status: acceptable    Comments: /83 (BP Location: Right arm, Patient Position: Sitting)   Pulse 56   Temp 36.1 °C (96.9 °F) (Temporal)   Resp 16   Ht 172.7 cm (68\")   Wt 71.3 kg (157 lb 3.2 oz)   SpO2 98%   BMI 23.90 kg/m²         "

## 2021-03-03 LAB
CYTO UR: NORMAL
LAB AP CASE REPORT: NORMAL
LAB AP CLINICAL INFORMATION: NORMAL
PATH REPORT.FINAL DX SPEC: NORMAL
PATH REPORT.GROSS SPEC: NORMAL

## 2021-03-19 ENCOUNTER — TELEPHONE (OUTPATIENT)
Dept: GASTROENTEROLOGY | Facility: CLINIC | Age: 61
End: 2021-03-19

## 2021-03-26 ENCOUNTER — BULK ORDERING (OUTPATIENT)
Dept: CASE MANAGEMENT | Facility: OTHER | Age: 61
End: 2021-03-26

## 2021-03-26 DIAGNOSIS — Z23 IMMUNIZATION DUE: ICD-10-CM

## 2021-03-29 ENCOUNTER — LAB (OUTPATIENT)
Dept: LAB | Facility: HOSPITAL | Age: 61
End: 2021-03-29

## 2021-03-29 ENCOUNTER — OFFICE VISIT (OUTPATIENT)
Dept: GASTROENTEROLOGY | Facility: CLINIC | Age: 61
End: 2021-03-29

## 2021-03-29 DIAGNOSIS — Z79.899 HIGH RISK MEDICATION USE: ICD-10-CM

## 2021-03-29 DIAGNOSIS — K51.30 ULCERATIVE RECTOSIGMOIDITIS WITHOUT COMPLICATION (HCC): Primary | ICD-10-CM

## 2021-03-29 LAB
HBV SURFACE AB SER RIA-ACNC: NORMAL
HBV SURFACE AG SERPL QL IA: NORMAL

## 2021-03-29 PROCEDURE — 86706 HEP B SURFACE ANTIBODY: CPT | Performed by: NURSE PRACTITIONER

## 2021-03-29 PROCEDURE — 99443 PR PHYS/QHP TELEPHONE EVALUATION 21-30 MIN: CPT | Performed by: NURSE PRACTITIONER

## 2021-03-29 PROCEDURE — 86704 HEP B CORE ANTIBODY TOTAL: CPT | Performed by: NURSE PRACTITIONER

## 2021-03-29 PROCEDURE — 86480 TB TEST CELL IMMUN MEASURE: CPT | Performed by: NURSE PRACTITIONER

## 2021-03-29 PROCEDURE — 85025 COMPLETE CBC W/AUTO DIFF WBC: CPT | Performed by: NURSE PRACTITIONER

## 2021-03-29 PROCEDURE — 87340 HEPATITIS B SURFACE AG IA: CPT | Performed by: NURSE PRACTITIONER

## 2021-03-29 PROCEDURE — 80076 HEPATIC FUNCTION PANEL: CPT | Performed by: NURSE PRACTITIONER

## 2021-03-29 NOTE — PROGRESS NOTES
Chief Complaint   Patient presents with   • Follow-up     discuss treatment options   • Ulcerative Colitis         History of Present Illness  60-year-old male presents today for follow-up.  He has an established diagnosis of ulcerative colitis.  He continues on azathioprine 150 mg daily as well as Apriso 4 tabs daily.  Also Canasa suppositories nightly.    After his colonoscopy was performed he has started using mesalamine enemas.  Bowel habits are regular without melena or hematochezia.  He denies pain or trouble with urgency or incontinence.    Dr. Darnell has had conversations with the patient in the past regarding maintenance treatment for ulcerative colitis with azathioprine.  Patient would like to discuss biological options given active inflammation on recent colonoscopy despite daily azathioprine, mesalamine and suppositories.    You have chosen to receive care through a telephone visit.   Do you consent to use a telephone visit for your medical care today? Yes    Review of Systems Constitutional: Negative for fatigue and unexpected weight change.         Result Review :      Tissue Pathology Exam (03/01/2021 08:20)   COLONOSCOPY (03/01/2021 08:00)     Physical Exam - TELEPHONE VISIT    Assessment and Plan    Diagnoses and all orders for this visit:    1. 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    Reviewed recent colonoscopy findings.  Given active inflammatory changes, discussed change in therapy with different mechanism of action to Entyvio or Stelara.    Will order blood work with TB and hepatitis B testing, and will continue current medication regimen at this time.  Based on review of information, research of vagus nerve stimulation and ulcerative  colitis and patient preference, to determine change in therapy to either Stelara or Entyvio as discussed with patient during today's visit.    Patient verbalized agreement and understanding with the above plan.  All questions answered and support provided.    ADDENDUM:   April 1, 2021.  Spoke with patient via telephone.  Reviewed journal article titled potential of electrical neuromodulation for inflammatory bowel disease published August 2020 in the Journal of inflammatory bowel disease, volume 26, issue 8.  There have been several studies to investigate the efficacy of vagus nerve stimulation and treating inflammatory bowel disease however long-term efficacy and safety are unclear and need to be observed by more high-quality animal and clinical studies.  Given the invasive nature of vagus nerve stimulation regulatory approval for clinical applications is time-consuming.    Encouraged patient to explore all modalities of treatment for ulcerative colitis including acupuncture, acupressure.  QuantiFERON gold blood test is pending, will contact patient once this has resulted.  Katja    This visit has been rescheduled as a phone visit to comply with patient safety concerns in accordance with CDC recommendations. Total time of discussion was  34 minutes.    Follow Up   No follow-ups on file.    Patient Instructions   Will order blood work for TB and Hepatitis B for possible biologic therapy initiation.  Orders to be emailed to patient, email confirmed    Reviewed recent colonoscopy findings, will continue azathioprine 150 mg daily, Apriso 4 pills daily and relates the enemas at this time.    We will touch base regarding any research findings for vagus nerve stimulation and ulcerative colitis.      Please review  [email or web address] website and treatment with vedolizumab and ustekinumab and any further questions or concerns and contact the office with any further questions or concerns otherwise we will be  in touch once blood work has resulted and research of vagus nerve and ulcerative colitis has been performed.

## 2021-03-29 NOTE — PATIENT INSTRUCTIONS
Will order blood work for TB and Hepatitis B for possible biologic therapy initiation.  Orders to be emailed to patient, email confirmed    Reviewed recent colonoscopy findings, will continue azathioprine 150 mg daily, Apriso 4 pills daily and relates the enemas at this time.    We will touch base regarding any research findings for vagus nerve stimulation and ulcerative colitis.      Please review  [email or web address] website and treatment with vedolizumab and ustekinumab and any further questions or concerns and contact the office with any further questions or concerns otherwise we will be in touch once blood work has resulted and research of vagus nerve and ulcerative colitis has been performed.

## 2021-03-30 LAB — HBV CORE AB SERPL QL IA: NEGATIVE

## 2021-04-01 LAB
GAMMA INTERFERON BACKGROUND BLD IA-ACNC: 0.07 IU/ML
M TB IFN-G BLD-IMP: NEGATIVE
M TB IFN-G CD4+ BCKGRND COR BLD-ACNC: 0.05 IU/ML
M TB IFN-G CD4+CD8+ BCKGRND COR BLD-ACNC: 0.04 IU/ML
MITOGEN IGNF BLD-ACNC: >10 IU/ML
QUANTIFERON INCUBATION: NORMAL
SERVICE CMNT-IMP: NORMAL

## 2021-04-23 RX ORDER — AZATHIOPRINE 50 MG/1
TABLET ORAL
Qty: 270 TABLET | Refills: 0 | Status: SHIPPED | OUTPATIENT
Start: 2021-04-23 | End: 2021-07-19 | Stop reason: SDUPTHER

## 2021-05-17 ENCOUNTER — OFFICE VISIT (OUTPATIENT)
Dept: FAMILY MEDICINE CLINIC | Facility: CLINIC | Age: 61
End: 2021-05-17

## 2021-05-17 VITALS
HEIGHT: 68 IN | TEMPERATURE: 98 F | OXYGEN SATURATION: 99 % | HEART RATE: 54 BPM | DIASTOLIC BLOOD PRESSURE: 70 MMHG | BODY MASS INDEX: 24.61 KG/M2 | SYSTOLIC BLOOD PRESSURE: 130 MMHG | WEIGHT: 162.4 LBS

## 2021-05-17 DIAGNOSIS — F32.0 CURRENT MILD EPISODE OF MAJOR DEPRESSIVE DISORDER, UNSPECIFIED WHETHER RECURRENT (HCC): ICD-10-CM

## 2021-05-17 DIAGNOSIS — M16.10 PRIMARY OSTEOARTHRITIS OF HIP, UNSPECIFIED LATERALITY: ICD-10-CM

## 2021-05-17 DIAGNOSIS — Z12.5 SCREENING PSA (PROSTATE SPECIFIC ANTIGEN): ICD-10-CM

## 2021-05-17 DIAGNOSIS — M15.9 OSTEOARTHRITIS OF MULTIPLE JOINTS, UNSPECIFIED OSTEOARTHRITIS TYPE: ICD-10-CM

## 2021-05-17 DIAGNOSIS — N52.9 ERECTILE DYSFUNCTION, UNSPECIFIED ERECTILE DYSFUNCTION TYPE: ICD-10-CM

## 2021-05-17 DIAGNOSIS — E78.00 HIGH CHOLESTEROL: ICD-10-CM

## 2021-05-17 DIAGNOSIS — Z00.00 WELLNESS EXAMINATION: Primary | ICD-10-CM

## 2021-05-17 DIAGNOSIS — K51.90 ULCERATIVE COLITIS WITHOUT COMPLICATIONS, UNSPECIFIED LOCATION (HCC): ICD-10-CM

## 2021-05-17 LAB
BILIRUB BLD-MCNC: NEGATIVE MG/DL
CLARITY, POC: CLEAR
COLOR UR: YELLOW
GLUCOSE UR STRIP-MCNC: NEGATIVE MG/DL
KETONES UR QL: NEGATIVE
LEUKOCYTE EST, POC: NEGATIVE
NITRITE UR-MCNC: NEGATIVE MG/ML
PH UR: 6.5 [PH] (ref 5–8)
PROT UR STRIP-MCNC: NEGATIVE MG/DL
RBC # UR STRIP: NEGATIVE /UL
SP GR UR: 1.01 (ref 1–1.03)
UROBILINOGEN UR QL: NORMAL

## 2021-05-17 PROCEDURE — 81003 URINALYSIS AUTO W/O SCOPE: CPT | Performed by: FAMILY MEDICINE

## 2021-05-17 PROCEDURE — 99396 PREV VISIT EST AGE 40-64: CPT | Performed by: FAMILY MEDICINE

## 2021-05-17 RX ORDER — MESALAMINE 4 G/60 ML
KIT RECTAL
COMMUNITY
Start: 2021-03-02 | End: 2021-09-13 | Stop reason: SDUPTHER

## 2021-05-17 NOTE — PROGRESS NOTES
"Chief Complaint  Establish Care and Annual Exam    Subjective          John Reis presents to [facility]  History of Present Illness  UC x 12 years just had Colonoscopy, non smoker, ETOH seldom, no drugs, , fasting, sister with breast cancer, sister with MI, dad and brother with DM, last COVID Vaccine, clearing throat lately sneezing some, no stuffy  nose no chest pain or shortness of breath, no abdominal pain, no dysuria, patient is otherwise in good health  Objective   Vital Signs:   /70 (BP Location: Right arm, Patient Position: Sitting)   Pulse 54   Temp 98 °F (36.7 °C) (Temporal)   Ht 172.7 cm (67.99\")   Wt 73.7 kg (162 lb 6.4 oz)   SpO2 99%   BMI 24.70 kg/m²     Physical Exam  Vitals and nursing note reviewed.   Constitutional:       Appearance: He is well-developed.   HENT:      Head: Normocephalic and atraumatic.      Right Ear: Tympanic membrane, ear canal and external ear normal.      Left Ear: Tympanic membrane, ear canal and external ear normal.      Nose: Nose normal.      Comments: Patient has swollen turbinates but no symptoms  Eyes:      General: No scleral icterus.        Right eye: No discharge.         Left eye: No discharge.      Conjunctiva/sclera: Conjunctivae normal.      Pupils: Pupils are equal, round, and reactive to light.   Neck:      Thyroid: No thyromegaly.      Vascular: No JVD.      Trachea: No tracheal deviation.   Cardiovascular:      Rate and Rhythm: Normal rate and regular rhythm.      Heart sounds: Normal heart sounds. No murmur heard.   No friction rub. No gallop.    Pulmonary:      Effort: Pulmonary effort is normal. No respiratory distress.      Breath sounds: Normal breath sounds. No wheezing or rales.   Chest:      Chest wall: No tenderness.   Abdominal:      General: Bowel sounds are normal. There is no distension.      Palpations: Abdomen is soft. There is no mass.      Tenderness: There is no abdominal tenderness. There " is no guarding or rebound.      Hernia: No hernia is present.   Musculoskeletal:         General: No tenderness. Normal range of motion.      Cervical back: Normal range of motion and neck supple.   Lymphadenopathy:      Cervical: No cervical adenopathy.   Skin:     General: Skin is warm and dry.   Neurological:      General: No focal deficit present.      Mental Status: He is alert.      Cranial Nerves: No cranial nerve deficit.      Sensory: No sensory deficit.      Motor: No abnormal muscle tone.      Coordination: Coordination normal.      Deep Tendon Reflexes: Reflexes normal.   Psychiatric:         Mood and Affect: Mood normal.         Behavior: Behavior normal.         Thought Content: Thought content normal.         Judgment: Judgment normal.        Result Review :                 Assessment and Plan {CC Problem List  Visit Diagnosis   ROS  Review (Popup)  Health Maintenance  Quality  BestPractice  Medications  SmartSets  SnapShot Encounters  Media :23}   Diagnoses and all orders for this visit:    1. Wellness examination (Primary)  -     Cancel: CBC & Differential  -     Cancel: Comprehensive Metabolic Panel  -     Cancel: Lipid Panel  -     Cancel: TSH  -     Cancel: POC Urinalysis Dipstick, Automated  -     Shingrix Vaccine  -     CBC & Differential  -     Comprehensive Metabolic Panel  -     Lipid Panel  -     TSH  -     POC Urinalysis Dipstick, Automated  -     Hepatitis C Antibody    2. Screening PSA (prostate specific antigen)  -     Cancel: PSA Screen  -     PSA Screen        Follow Up   Return in about 1 year (around 5/17/2022).  Patient was given instructions and counseling regarding his condition or for health maintenance advice. Please see specific information pulled into the AVS if appropriate.   Patient does not want any nasal spray at this moment, has no symptoms  Order for shingles vaccine given

## 2021-05-18 ENCOUNTER — TELEPHONE (OUTPATIENT)
Dept: FAMILY MEDICINE CLINIC | Facility: CLINIC | Age: 61
End: 2021-05-18

## 2021-05-18 DIAGNOSIS — D69.6 THROMBOCYTOPENIA (HCC): Primary | ICD-10-CM

## 2021-05-18 LAB
ALBUMIN SERPL-MCNC: 4.5 G/DL (ref 3.5–5.2)
ALBUMIN/GLOB SERPL: 2.4 G/DL
ALP SERPL-CCNC: 50 U/L (ref 39–117)
ALT SERPL-CCNC: 15 U/L (ref 1–41)
AST SERPL-CCNC: 24 U/L (ref 1–40)
BASOPHILS # BLD AUTO: ABNORMAL 10*3/UL
BASOPHILS # BLD MANUAL: 0.05 10*3/MM3 (ref 0–0.2)
BASOPHILS NFR BLD MANUAL: 1 % (ref 0–1.5)
BILIRUB SERPL-MCNC: 0.4 MG/DL (ref 0–1.2)
BUN SERPL-MCNC: 12 MG/DL (ref 8–23)
BUN/CREAT SERPL: 14.6 (ref 7–25)
CALCIUM SERPL-MCNC: 9.3 MG/DL (ref 8.6–10.5)
CHLORIDE SERPL-SCNC: 106 MMOL/L (ref 98–107)
CHOLEST SERPL-MCNC: 138 MG/DL (ref 0–200)
CO2 SERPL-SCNC: 28 MMOL/L (ref 22–29)
CREAT SERPL-MCNC: 0.82 MG/DL (ref 0.76–1.27)
DIFFERENTIAL COMMENT: ABNORMAL
EOSINOPHIL # BLD AUTO: ABNORMAL 10*3/UL
EOSINOPHIL # BLD MANUAL: 0.28 10*3/MM3 (ref 0–0.4)
EOSINOPHIL NFR BLD AUTO: ABNORMAL %
EOSINOPHIL NFR BLD MANUAL: 5.2 % (ref 0.3–6.2)
ERYTHROCYTE [DISTWIDTH] IN BLOOD BY AUTOMATED COUNT: 13.3 % (ref 12.3–15.4)
GLOBULIN SER CALC-MCNC: 1.9 GM/DL
GLUCOSE SERPL-MCNC: 90 MG/DL (ref 65–99)
HCT VFR BLD AUTO: 43 % (ref 37.5–51)
HCV AB S/CO SERPL IA: <0.1 S/CO RATIO (ref 0–0.9)
HDLC SERPL-MCNC: 48 MG/DL (ref 40–60)
HGB BLD-MCNC: 15.3 G/DL (ref 13–17.7)
LDLC SERPL CALC-MCNC: 78 MG/DL (ref 0–100)
LYMPHOCYTES # BLD AUTO: ABNORMAL 10*3/UL
LYMPHOCYTES # BLD MANUAL: 0.82 10*3/MM3 (ref 0.7–3.1)
LYMPHOCYTES NFR BLD AUTO: ABNORMAL %
LYMPHOCYTES NFR BLD MANUAL: 15.5 % (ref 19.6–45.3)
MCH RBC QN AUTO: 33.9 PG (ref 26.6–33)
MCHC RBC AUTO-ENTMCNC: 35.6 G/DL (ref 31.5–35.7)
MCV RBC AUTO: 95.3 FL (ref 79–97)
MONOCYTES # BLD MANUAL: 0.05 10*3/MM3 (ref 0.1–0.9)
MONOCYTES NFR BLD AUTO: ABNORMAL %
MONOCYTES NFR BLD MANUAL: 1 % (ref 5–12)
NEUTROPHILS # BLD MANUAL: 4.1 10*3/MM3 (ref 1.7–7)
NEUTROPHILS NFR BLD AUTO: ABNORMAL %
NEUTROPHILS NFR BLD MANUAL: 77.3 % (ref 42.7–76)
PLATELET # BLD AUTO: 120 10*3/MM3 (ref 140–450)
PLATELET BLD QL SMEAR: ABNORMAL
POTASSIUM SERPL-SCNC: 4.3 MMOL/L (ref 3.5–5.2)
PROT SERPL-MCNC: 6.4 G/DL (ref 6–8.5)
PSA SERPL-MCNC: 0.39 NG/ML (ref 0–4)
RBC # BLD AUTO: 4.51 10*6/MM3 (ref 4.14–5.8)
RBC MORPH BLD: ABNORMAL
SODIUM SERPL-SCNC: 140 MMOL/L (ref 136–145)
TRIGL SERPL-MCNC: 53 MG/DL (ref 0–150)
TSH SERPL DL<=0.005 MIU/L-ACNC: 1.96 UIU/ML (ref 0.27–4.2)
VLDLC SERPL CALC-MCNC: 12 MG/DL (ref 5–40)
WBC # BLD AUTO: 5.31 10*3/MM3 (ref 3.4–10.8)

## 2021-05-18 NOTE — TELEPHONE ENCOUNTER
I SPOKE WITH PATIENT ABOUT THIS, PATIENT STATED UNDERSTANDING. HE DID MENTIONED THAT HE NOTIFIED THE LAB THAT HE HAD DONATED PLATELETS THE DAY PRIOR TO HAVING HIS LABS DONE, WHICH MAY COUNT FOR THE LOW LEVEL.    CALL BACK IF NEEDED:  [phone]

## 2021-05-18 NOTE — TELEPHONE ENCOUNTER
YOANDYTCCARLOS     Okay for HUB to read       Platelets a little low, recheck in a month, rest of the labs are normal

## 2021-05-18 NOTE — TELEPHONE ENCOUNTER
----- Message from Armond Knight MD sent at 5/18/2021 10:18 AM EDT -----  Please call the patient regarding his abnormal result. Platelets a little low, recheck in a month, rest of the labs are normal

## 2021-05-19 NOTE — PROGRESS NOTES
"Chief Complaint   Patient presents with   • Follow-up     ulcerative colitis         History of Present Illness  61-year old male presents today for follow up. He has a history of ulcerative colitis. He takes Apriso daily and azathioprine 150 mg daily. Most recent colonoscopy March 2021. He presents today to discuss his medication regimen. He reports that at this time he would like to switch from azathioprine to Entyvio.     He reports that he donates platelets and his last platelet level was low.     Review of Systems   Gastrointestinal: Negative for abdominal distention, abdominal pain, anal bleeding, blood in stool, constipation, diarrhea, nausea, rectal pain and vomiting.        Result Review :       COLONOSCOPY (03/01/2021 08:00)  Hepatitis C Antibody (05/17/2021 12:16)  Tissue Pathology Exam (03/01/2021 08:20)  azaTHIOprine (IMURAN) 50 MG tablet (04/23/2021)  Office Visit with Tierney Matute APRN (03/29/2021)  QuantiFERON TB Gold (03/29/2021 13:52)  Hepatitis B Surface Antigen (03/29/2021 13:52)  Hepatitis B Surface Antibody (03/29/2021 13:52)    Vital Signs:   BP 90/62   Pulse 50   Temp 97.3 °F (36.3 °C)   Ht 172.7 cm (68\")   Wt 72.8 kg (160 lb 9.6 oz)   SpO2 98%   BMI 24.42 kg/m²     Body mass index is 24.42 kg/m².     Physical Exam  Constitutional:       General: He is not in acute distress.     Appearance: He is well-developed.   Pulmonary:      Effort: No respiratory distress.   Skin:     Coloration: Skin is not pale.         Assessment and Plan    Diagnoses and all orders for this visit:    1. High risk medication use (Primary)  -     DEXA Bone Density Axial; Future    2. 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       I have reviewed and confirmed the accuracy of the HPI and Assessment and Plan " as documented by the APRN Angelica Tidwell, APRN        Follow Up   No follow-ups on file.    Patient Instructions   1. For ulcerative colitis, continue Apriso.     2. We will begin paperwork to start Entyvio. You will receive your induction infusions at week 0, 2, and week 6. Infusions then are given every 8 weeks.     3. Continue azathioprine for now at your current dosing schedule. You may discontinue your azathioprine after you have completed your 3rd induction dose which will be given at week 6. We recommend stopping the azathioprine at a time between the 6 week infusion and sometime in the 2 weeks following your 3rd infusion.     4. We will plan for next office follow up after his 2nd induction dose of Entyvio.           Discussion:    Discussion was held with the patient regarding discontinuing azathioprine and switching to biologic therapy such as Entyvio.

## 2021-05-20 ENCOUNTER — OFFICE VISIT (OUTPATIENT)
Dept: GASTROENTEROLOGY | Facility: CLINIC | Age: 61
End: 2021-05-20

## 2021-05-20 VITALS
WEIGHT: 160.6 LBS | BODY MASS INDEX: 24.34 KG/M2 | SYSTOLIC BLOOD PRESSURE: 90 MMHG | HEART RATE: 50 BPM | HEIGHT: 68 IN | DIASTOLIC BLOOD PRESSURE: 62 MMHG | OXYGEN SATURATION: 98 % | TEMPERATURE: 97.3 F

## 2021-05-20 DIAGNOSIS — K51.30 ULCERATIVE RECTOSIGMOIDITIS WITHOUT COMPLICATION (HCC): Chronic | ICD-10-CM

## 2021-05-20 DIAGNOSIS — Z79.899 HIGH RISK MEDICATION USE: Primary | Chronic | ICD-10-CM

## 2021-05-20 PROCEDURE — 99214 OFFICE O/P EST MOD 30 MIN: CPT | Performed by: INTERNAL MEDICINE

## 2021-05-20 NOTE — PATIENT INSTRUCTIONS
1. For ulcerative colitis, continue Apriso.     2. We will begin paperwork to start Entyvio. You will receive your induction infusions at week 0, 2, and week 6. Infusions then are given every 8 weeks.     3. Continue azathioprine for now at your current dosing schedule. You may discontinue your azathioprine after you have completed your 3rd induction dose which will be given at week 6. We recommend stopping the azathioprine at a time between the 6 week infusion and sometime in the 2 weeks following your 3rd infusion.     4. We will plan for next office follow up after his 2nd induction dose of Entyvio.

## 2021-06-07 RX ORDER — MESALAMINE 0.38 G/1
CAPSULE, EXTENDED RELEASE ORAL
Qty: 360 CAPSULE | Refills: 3 | Status: SHIPPED | OUTPATIENT
Start: 2021-06-07 | End: 2022-03-14

## 2021-07-15 RX ORDER — AZATHIOPRINE 50 MG/1
TABLET ORAL
Qty: 270 TABLET | Refills: 3 | OUTPATIENT
Start: 2021-07-15

## 2021-07-15 RX ORDER — MESALAMINE 0.38 G/1
CAPSULE, EXTENDED RELEASE ORAL
COMMUNITY
Start: 2021-06-07

## 2021-07-15 RX ORDER — AZATHIOPRINE 50 MG/1
TABLET ORAL
COMMUNITY
Start: 2021-04-23

## 2021-07-19 RX ORDER — AZATHIOPRINE 50 MG/1
TABLET ORAL
Qty: 270 TABLET | Refills: 3 | OUTPATIENT
Start: 2021-07-19

## 2021-07-19 RX ORDER — AZATHIOPRINE 50 MG/1
TABLET ORAL
Qty: 270 TABLET | Refills: 0 | Status: SHIPPED | OUTPATIENT
Start: 2021-07-19 | End: 2021-09-13

## 2021-07-29 ENCOUNTER — TELEPHONE (OUTPATIENT)
Dept: FAMILY MEDICINE CLINIC | Facility: CLINIC | Age: 61
End: 2021-07-29

## 2021-07-29 NOTE — TELEPHONE ENCOUNTER
Caller: JAN GUERRERO    Relationship: Other    Best call back number:[phone]    Additional information or concerns: PATIENT'S INSURANCE WILL NOT COVER THE LABS FROM MAY 2020 UNLESS THE DIAGNOSIS IS CHANGED. REVIEW LAB ORDER AND SUBMIT A MORE APPROPRIATE DIAGNOSES FOR THE METABOLIC PANEL, CBS, AND THYROID STIMULATING HORMONE  SUBMIT CHANGE TO [facility] BY FAX IF POSSIBLE. NO FAX NUMBER PROVIED.  PLEASE CALL BACK

## 2021-08-02 ENCOUNTER — HOSPITAL ENCOUNTER (OUTPATIENT)
Dept: BONE DENSITY | Facility: HOSPITAL | Age: 61
Discharge: HOME | End: 2021-08-02
Admitting: NURSE PRACTITIONER

## 2021-08-02 ENCOUNTER — TELEPHONE (OUTPATIENT)
Dept: GASTROENTEROLOGY | Facility: CLINIC | Age: 61
End: 2021-08-02

## 2021-08-02 DIAGNOSIS — Z79.899 HIGH RISK MEDICATION USE: Chronic | ICD-10-CM

## 2021-08-02 DIAGNOSIS — K51.30 ULCERATIVE RECTOSIGMOIDITIS WITHOUT COMPLICATION (HCC): Chronic | ICD-10-CM

## 2021-08-02 PROCEDURE — 77080 DXA BONE DENSITY AXIAL: CPT

## 2021-08-02 NOTE — TELEPHONE ENCOUNTER
Clarification is needed for the patients Azathioprine. On the After visit summary, is is confusing and the patient needs clarification. Please advise.

## 2021-09-09 NOTE — PROGRESS NOTES
"Chief Complaint   Patient presents with   • Ulcerative Colitis         History of Present Illness  Patient is a 61-year-old male who presents today for follow-up.  He has a history of ulcerative colitis, previous treatment with Apriso and azathioprine.  He started Entyvio June 2021.    Patient presents today for follow-up.  He has completed his induction dosing of Entyvio and is due for his first maintenance injection on September 27.  He reports no side effects or problems with the infusions thus far.  He has discontinued azathioprine and continues on Apriso.  He uses mesalamine enemas as needed for symptoms.    Since starting Entyvio, he reports he is feeling very well.  He feels as though he feels better on the Entyvio than he did on azathioprine.  He denies any blood in the stool, diarrhea, fecal urgency, or abdominal pain.  He is generally having 1 bowel movement per day.         Result Review :       Tissue Pathology Exam (03/01/2021 08:20)   COLONOSCOPY (03/01/2021 08:00)   Patient Message with Saroj Darnell MD (08/17/2021)   Office Visit with Saroj Darnell MD (05/20/2021)       Vital Signs:   /72   Pulse 55   Temp 97.7 °F (36.5 °C) (Temporal)   Ht 172.7 cm (68\")   Wt 74.3 kg (163 lb 12.8 oz)   SpO2 100%   BMI 24.91 kg/m²     Body mass index is 24.91 kg/m².     Physical Exam  Vitals reviewed.   Constitutional:       General: He is not in acute distress.     Appearance: He is well-developed.   HENT:      Head: Normocephalic and atraumatic.   Pulmonary:      Effort: Pulmonary effort is normal. No respiratory distress.   Abdominal:      General: Abdomen is flat. Bowel sounds are normal. There is no distension.      Palpations: Abdomen is soft.      Tenderness: There is no abdominal tenderness.   Skin:     General: Skin is dry.      Coloration: Skin is not pale.   Neurological:      Mental Status: He is alert and oriented to person, place, and time.   Psychiatric:         Thought Content: " Thought content normal.           Assessment and Plan {CC Problem List  Visit Diagnosis  ROS  Review (Popup)  [facility] Maintenance  Quality  BestPractice  Medications  SmartSets  SnapShot Encounters  Media :23}   Diagnoses and all orders for this visit:    1. 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 High risk medication use  -     CBC & Differential; Future  -     Comprehensive Metabolic Panel; Future    Other orders  -     Mesalamine-Cleanser 4 g kit; Insert 4 g into the rectum Every Night.  Dispense: 30 each; Refill: 5         Discussion  Patient presents today for follow-up of ulcerative colitis after starting Entyvio.  He has discontinued azathioprine and overall is feeling very well.  He is currently asymptomatic from a colitis standpoint.  We will continue treatment with Entyvio and check lab work for monitoring with his next infusion.  We will continue Apriso for now however if patient continues to feel well over the next 1 to 2 months, discussed he may discontinue this.  We did discussed that limited studies have suggested mesalamine preparations may decrease risk of colorectal cancer however discussed primary method to decrease this risk is to control inflammation.  We will plan on 6-month follow-up for reassessment.          Follow Up   Return in about 6 months (around 3/13/2022).    Patient Instructions   For ulcerative colitis, continue Entyvio every 8 weeks.    Check lab work for monitoring with next infusion.    Due to high risk medication use, it is recommended to stay up to date on vaccinations including a yearly flu vaccine.  Due to being on immunosuppressive medication, based on current CDC recommendations, a third Covid vaccine is recommended.  It is recommended this be administered at least 28 days after the second dose.

## 2021-09-13 ENCOUNTER — OFFICE VISIT (OUTPATIENT)
Dept: GASTROENTEROLOGY | Facility: CLINIC | Age: 61
End: 2021-09-13

## 2021-09-13 VITALS
BODY MASS INDEX: 24.83 KG/M2 | SYSTOLIC BLOOD PRESSURE: 114 MMHG | OXYGEN SATURATION: 100 % | HEIGHT: 68 IN | WEIGHT: 163.8 LBS | DIASTOLIC BLOOD PRESSURE: 72 MMHG | HEART RATE: 55 BPM | TEMPERATURE: 97.7 F

## 2021-09-13 DIAGNOSIS — Z79.899 HIGH RISK MEDICATION USE: ICD-10-CM

## 2021-09-13 DIAGNOSIS — K51.30 ULCERATIVE RECTOSIGMOIDITIS WITHOUT COMPLICATION (HCC): Primary | ICD-10-CM

## 2021-09-13 PROCEDURE — 99213 OFFICE O/P EST LOW 20 MIN: CPT | Performed by: NURSE PRACTITIONER

## 2021-09-13 RX ORDER — MESALAMINE 4 G/60 ML
4 KIT RECTAL NIGHTLY
Qty: 30 EACH | Refills: 5 | Status: SHIPPED | OUTPATIENT
Start: 2021-09-13

## 2021-09-13 NOTE — PATIENT INSTRUCTIONS
For ulcerative colitis, continue Entyvio every 8 weeks.    Check lab work for monitoring with next infusion.    Due to high risk medication use, it is recommended to stay up to date on vaccinations including a yearly flu vaccine.  Due to being on immunosuppressive medication, based on current CDC recommendations, a third Covid vaccine is recommended.  It is recommended this be administered at least 28 days after the second dose.

## 2021-09-27 ENCOUNTER — TELEPHONE (OUTPATIENT)
Dept: FAMILY MEDICINE CLINIC | Facility: CLINIC | Age: 61
End: 2021-09-27

## 2021-09-27 NOTE — TELEPHONE ENCOUNTER
Caller: John Reis    Relationship: Self    Best call back number:[phone] PLEASE LEAVE MESSAGE ON VM IF NO ANSWER    What does billing need from the patient: PATIENT CALLED STATED THAT HE HAS RECEIVED SEVERAL NOTICES THAT INSURANCE DID NOT PAY FOR HIS PSA LAB WORK ON MAY 17.  PATIENT IS BEING BILLED .54 AND HIS INSURANCE COMPANY STATED THAT IT NEEDED TO BE SENT BACK THROUGH AND CODED CORRECTLY TO BE PAID.    PATIENT IS CONCERNED THAT IT WILL GO ON HIS CREDIT REPORT AS A COLLECTION AND HE IS NOT REFUSING TO PAY BUT HE WOULD LIKE TO UNDERSTAND WHAT THE CHARGE IS FOR OR IF IT IN FACT DOES NEED TO BE RECODED TO BE HONORED AND PAID BY HIS INSURANCE.    PLEASE ADVISE PATIENT WHEN SENT BACK THROUGH WITH CORRECT CODING.

## 2021-09-30 ENCOUNTER — TELEPHONE (OUTPATIENT)
Dept: GASTROENTEROLOGY | Facility: CLINIC | Age: 61
End: 2021-09-30

## 2021-09-30 NOTE — TELEPHONE ENCOUNTER
Gave verbal order for CBC & CMP with every other infusion to [facility].  [phone]  pk

## 2021-12-05 ENCOUNTER — PATIENT MESSAGE (OUTPATIENT)
Dept: FAMILY MEDICINE CLINIC | Facility: CLINIC | Age: 61
End: 2021-12-05

## 2021-12-13 ENCOUNTER — OFFICE VISIT (OUTPATIENT)
Dept: FAMILY MEDICINE CLINIC | Facility: CLINIC | Age: 61
End: 2021-12-13

## 2021-12-13 VITALS
HEART RATE: 83 BPM | HEIGHT: 68 IN | BODY MASS INDEX: 24.71 KG/M2 | TEMPERATURE: 97.7 F | SYSTOLIC BLOOD PRESSURE: 110 MMHG | OXYGEN SATURATION: 100 % | WEIGHT: 163 LBS | DIASTOLIC BLOOD PRESSURE: 70 MMHG

## 2021-12-13 DIAGNOSIS — M16.10 PRIMARY OSTEOARTHRITIS OF HIP, UNSPECIFIED LATERALITY: ICD-10-CM

## 2021-12-13 DIAGNOSIS — F32.0 CURRENT MILD EPISODE OF MAJOR DEPRESSIVE DISORDER, UNSPECIFIED WHETHER RECURRENT (HCC): ICD-10-CM

## 2021-12-13 DIAGNOSIS — D69.6 THROMBOCYTOPENIA (HCC): ICD-10-CM

## 2021-12-13 DIAGNOSIS — R29.898 WEAKNESS OF BOTH UPPER EXTREMITIES: ICD-10-CM

## 2021-12-13 DIAGNOSIS — K51.90 ULCERATIVE COLITIS WITHOUT COMPLICATIONS, UNSPECIFIED LOCATION (HCC): ICD-10-CM

## 2021-12-13 DIAGNOSIS — Z00.00 WELLNESS EXAMINATION: Primary | ICD-10-CM

## 2021-12-13 DIAGNOSIS — N52.9 ERECTILE DYSFUNCTION, UNSPECIFIED ERECTILE DYSFUNCTION TYPE: ICD-10-CM

## 2021-12-13 DIAGNOSIS — E78.00 HIGH CHOLESTEROL: ICD-10-CM

## 2021-12-13 PROCEDURE — 90686 IIV4 VACC NO PRSV 0.5 ML IM: CPT | Performed by: FAMILY MEDICINE

## 2021-12-13 PROCEDURE — 99213 OFFICE O/P EST LOW 20 MIN: CPT | Performed by: FAMILY MEDICINE

## 2021-12-13 PROCEDURE — 91300 COVID-19 (PFIZER): CPT | Performed by: FAMILY MEDICINE

## 2021-12-13 PROCEDURE — 0001A COVID-19 (PFIZER): CPT | Performed by: FAMILY MEDICINE

## 2021-12-13 PROCEDURE — 90471 IMMUNIZATION ADMIN: CPT | Performed by: FAMILY MEDICINE

## 2021-12-13 NOTE — PROGRESS NOTES
"Chief Complaint  Has had a recent sensation in arms (11/23/21  not happened since)    Subjective          John Reis presents to [facility]  History of Present Illness  During work weak sensation on both arms while extending both arms x 15 seconds, no tingling, numbness, really concentrate to keep arms higher, , a month ago, no H/a, no tingling, no numbness, GP with history of stroke, and patient has no chest pain or shortness of breath, no weakness of arms or legs, no headaches, no dizziness, normal vision, no neurologic deficits, he did feel that the left side of his body was weaker than the right side on the upper extremities when that happened  Objective   Vital Signs:   /70   Pulse 83   Temp 97.7 °F (36.5 °C) (Infrared)   Ht 172.7 cm (68\")   Wt 73.9 kg (163 lb)   SpO2 100%   BMI 24.78 kg/m²     Physical Exam  Vitals and nursing note reviewed.   Constitutional:       Appearance: He is well-developed.   HENT:      Head: Normocephalic and atraumatic.      Right Ear: Tympanic membrane, ear canal and external ear normal.      Left Ear: Tympanic membrane, ear canal and external ear normal.      Nose: Nose normal.   Eyes:      General: No scleral icterus.        Right eye: No discharge.         Left eye: No discharge.      Conjunctiva/sclera: Conjunctivae normal.      Pupils: Pupils are equal, round, and reactive to light.   Neck:      Thyroid: No thyromegaly.      Vascular: No carotid bruit or JVD.      Trachea: No tracheal deviation.   Cardiovascular:      Rate and Rhythm: Normal rate and regular rhythm.      Heart sounds: Normal heart sounds. No murmur heard.  No friction rub. No gallop.    Pulmonary:      Effort: Pulmonary effort is normal. No respiratory distress.      Breath sounds: Normal breath sounds. No wheezing or rales.   Chest:      Chest wall: No tenderness.   Abdominal:      General: Bowel sounds are normal. There is no distension.      Palpations: " Abdomen is soft. There is no mass.      Tenderness: There is no abdominal tenderness. There is no guarding or rebound.      Hernia: No hernia is present.   Musculoskeletal:         General: No tenderness. Normal range of motion.      Cervical back: Normal range of motion and neck supple.   Lymphadenopathy:      Cervical: No cervical adenopathy.   Skin:     General: Skin is warm and dry.   Neurological:      General: No focal deficit present.      Mental Status: He is alert.      Cranial Nerves: No cranial nerve deficit.      Sensory: No sensory deficit.      Motor: No weakness or abnormal muscle tone.      Coordination: Coordination normal.      Gait: Gait normal.      Deep Tendon Reflexes: Reflexes normal.   Psychiatric:         Mood and Affect: Mood normal.         Behavior: Behavior normal.         Thought Content: Thought content normal.         Judgment: Judgment normal.        Result Review :                 Assessment and Plan    Diagnoses and all orders for this visit:    1. Wellness examination (Primary)  -     CBC & Differential; Future  -     Comprehensive Metabolic Panel; Future  -     Lipid Panel; Future  -     TSH; Future  -     FluLaval/Fluarix/Fluzone >6 Months (4161-2373)  -     COVID-19 Vaccine (Pfizer)    2. Weakness of both upper extremities  -     CBC & Differential; Future  -     Comprehensive Metabolic Panel; Future  -     Lipid Panel; Future  -     TSH; Future  -     Cancel: MRI Brain Without Contrast; Future  -     MRI Brain Without Contrast; Future    3. Thrombocytopenia (HCC)  -     CBC & Differential; Future  -     Comprehensive Metabolic Panel; Future  -     Lipid Panel; Future  -     TSH; Future  -     FluLaval/Fluarix/Fluzone >6 Months (7994-6502)  -     COVID-19 Vaccine (Pfizer)    4. Erectile dysfunction, unspecified erectile dysfunction type  -     CBC & Differential; Future  -     Comprehensive Metabolic Panel; Future  -     Lipid Panel; Future  -     TSH; Future  -      FluLaval/Fluarix/Fluzone >6 Months (6708-5950)  -     COVID-19 Vaccine (Pfizer)    5. Ulcerative colitis without complications, unspecified location (HCC)  -     CBC & Differential; Future  -     Comprehensive Metabolic Panel; Future  -     Lipid Panel; Future  -     TSH; Future  -     FluLaval/Fluarix/Fluzone >6 Months (8019-6428)  -     COVID-19 Vaccine (Pfizer)    6. Current mild episode of major depressive disorder, unspecified whether recurrent (HCC)  -     CBC & Differential; Future  -     Comprehensive Metabolic Panel; Future  -     Lipid Panel; Future  -     TSH; Future  -     FluLaval/Fluarix/Fluzone >6 Months (6568-7870)  -     COVID-19 Vaccine (Pfizer)    7. Primary osteoarthritis of hip, unspecified laterality  -     CBC & Differential; Future  -     Comprehensive Metabolic Panel; Future  -     Lipid Panel; Future  -     TSH; Future  -     FluLaval/Fluarix/Fluzone >6 Months (4225-1698)  -     COVID-19 Vaccine (Pfizer)    8. High cholesterol  -     CBC & Differential; Future  -     Comprehensive Metabolic Panel; Future  -     Lipid Panel; Future  -     TSH; Future  -     FluLaval/Fluarix/Fluzone >6 Months (4737-2491)  -     COVID-19 Vaccine (Pfizer)        Follow Up   Return if symptoms worsen or fail to improve.  Patient was given instructions and counseling regarding his condition or for health maintenance advice. Please see specific information pulled into the AVS if appropriate.   Patient agreed to go to the ER if no improvement

## 2021-12-20 DIAGNOSIS — E78.00 HIGH CHOLESTEROL: Primary | ICD-10-CM

## 2021-12-20 DIAGNOSIS — Z00.00 WELLNESS EXAMINATION: ICD-10-CM

## 2021-12-20 DIAGNOSIS — R29.898 WEAKNESS OF BOTH UPPER EXTREMITIES: ICD-10-CM

## 2021-12-20 PROCEDURE — 99213 OFFICE O/P EST LOW 20 MIN: CPT | Performed by: FAMILY MEDICINE

## 2021-12-21 LAB
ALBUMIN SERPL-MCNC: 4.3 G/DL (ref 3.8–4.9)
ALBUMIN/GLOB SERPL: 1.7 {RATIO} (ref 1.2–2.2)
ALP SERPL-CCNC: 57 IU/L (ref 44–121)
ALT SERPL-CCNC: 19 IU/L (ref 0–44)
AST SERPL-CCNC: 33 IU/L (ref 0–40)
BASOPHILS # BLD AUTO: 0 X10E3/UL (ref 0–0.2)
BASOPHILS NFR BLD AUTO: 1 %
BILIRUB SERPL-MCNC: 0.4 MG/DL (ref 0–1.2)
BUN SERPL-MCNC: 12 MG/DL (ref 8–27)
BUN/CREAT SERPL: 12 (ref 10–24)
CALCIUM SERPL-MCNC: 9.5 MG/DL (ref 8.6–10.2)
CHLORIDE SERPL-SCNC: 104 MMOL/L (ref 96–106)
CHOLEST SERPL-MCNC: 177 MG/DL (ref 100–199)
CO2 SERPL-SCNC: 23 MMOL/L (ref 20–29)
CREAT SERPL-MCNC: 0.97 MG/DL (ref 0.76–1.27)
EOSINOPHIL # BLD AUTO: 0.2 X10E3/UL (ref 0–0.4)
EOSINOPHIL NFR BLD AUTO: 3 %
ERYTHROCYTE [DISTWIDTH] IN BLOOD BY AUTOMATED COUNT: 12.2 % (ref 11.6–15.4)
GLOBULIN SER CALC-MCNC: 2.6 G/DL (ref 1.5–4.5)
GLUCOSE SERPL-MCNC: 97 MG/DL (ref 65–99)
HCT VFR BLD AUTO: 43.4 % (ref 37.5–51)
HDLC SERPL-MCNC: 54 MG/DL
HGB BLD-MCNC: 15.1 G/DL (ref 13–17.7)
IMM GRANULOCYTES # BLD AUTO: 0 X10E3/UL (ref 0–0.1)
IMM GRANULOCYTES NFR BLD AUTO: 0 %
LDLC SERPL CALC-MCNC: 114 MG/DL (ref 0–99)
LYMPHOCYTES # BLD AUTO: 2.2 X10E3/UL (ref 0.7–3.1)
LYMPHOCYTES NFR BLD AUTO: 39 %
MCH RBC QN AUTO: 31.6 PG (ref 26.6–33)
MCHC RBC AUTO-ENTMCNC: 34.8 G/DL (ref 31.5–35.7)
MCV RBC AUTO: 91 FL (ref 79–97)
MONOCYTES # BLD AUTO: 0.5 X10E3/UL (ref 0.1–0.9)
MONOCYTES NFR BLD AUTO: 9 %
NEUTROPHILS # BLD AUTO: 2.6 X10E3/UL (ref 1.4–7)
NEUTROPHILS NFR BLD AUTO: 48 %
PLATELET # BLD AUTO: 238 X10E3/UL (ref 150–450)
POTASSIUM SERPL-SCNC: 4.2 MMOL/L (ref 3.5–5.2)
PROT SERPL-MCNC: 6.9 G/DL (ref 6–8.5)
RBC # BLD AUTO: 4.78 X10E6/UL (ref 4.14–5.8)
SODIUM SERPL-SCNC: 141 MMOL/L (ref 134–144)
TRIGL SERPL-MCNC: 43 MG/DL (ref 0–149)
TSH SERPL DL<=0.005 MIU/L-ACNC: 2.23 UIU/ML (ref 0.45–4.5)
VLDLC SERPL CALC-MCNC: 9 MG/DL (ref 5–40)
WBC # BLD AUTO: 5.5 X10E3/UL (ref 3.4–10.8)

## 2022-01-18 ENCOUNTER — TELEPHONE (OUTPATIENT)
Dept: FAMILY MEDICINE CLINIC | Facility: CLINIC | Age: 62
End: 2022-01-18

## 2022-01-18 NOTE — TELEPHONE ENCOUNTER
PATIENT RECEIVED A BILL FOR LABS DATING 12-20-21   FOR $157.91    THE INSURANCE ONLY COVERED $2 OF THE BILL    CAN YOU LOOK INTO THIS AND SEE WHY THE INSURANCE DIDN'T COVER THIS VISIT?     PLEASE CALL PATIENT AND DISCUSS     John Reis (Self) [phone] ()

## 2022-01-19 NOTE — TELEPHONE ENCOUNTER
Pt will call his insurance company and see what the problem is.  He said he never had this problem at Dr. Gordillo's office.  He will call back if it is a dx problem with the lab test.

## 2022-01-25 ENCOUNTER — TELEPHONE (OUTPATIENT)
Dept: FAMILY MEDICINE CLINIC | Facility: CLINIC | Age: 62
End: 2022-01-25

## 2022-01-25 NOTE — TELEPHONE ENCOUNTER
Provider: DR FADI CODY    Caller: KAREN    Relationship to Patient: [facility]    Phone Number: [phone]-SAY REPRESENTATIVE AND THEN ASK FOR KAREN AT EXT [id number]    Reason for Call: KAREN STATES THAT THE PATIENT WAS SEEN ON 12/13/21 WITH A MEDICAL DIAGNOSIS.  HE HAD LABS DRAWN ON 12/20/21 BUT IT WAS BILLED AS ROUTINE PHYSICAL.  THEY ARE JUST CHECKING TO SEE IF THAT IS CORRECT.  KAREN STATES THE PATIENT STATED HE WAS SEEN FOR A MEDICAL CONCERN AND NOT A ROUTINE PHYSICAL.

## 2022-02-01 ENCOUNTER — TELEPHONE (OUTPATIENT)
Dept: FAMILY MEDICINE CLINIC | Facility: CLINIC | Age: 62
End: 2022-02-01

## 2022-02-01 NOTE — TELEPHONE ENCOUNTER
Hub staff attempted to follow warm transfer process and was unsuccessful     Caller: John Reis    Relationship to patient: Self    Best call back number: [phone]     Patient is needing: PATIENT STATED HE HAD A WRONG DIAGNOSIS CODE ON A BILL FROM [employer] . PATIENT HAS SPOKEN TO [employer] AND HIS INSURANCE AND WAS TOLD TO CONTACT THE OFFICE . DATE OF SERVICE IS 12/20/2021

## 2022-03-10 NOTE — PROGRESS NOTES
"Chief Complaint   Patient presents with   • Ulcerative Colitis           History of Present Illness  Patient is a 61-year-old male who presents today for follow-up. He has a history of ulcerative colitis, current treatment with Entyvio started in August 2021.  Previous treatment with Apriso and azathioprine.    Patient presents today for follow-up.  He continues on Entyvio every 8 weeks.  He uses mesalamine enemas as needed if he experiences worsening symptoms.  He reports no difficulty or problems with the injections and overall has found this medication helpful.    He denies any abdominal cramping or pain.  Denies any rectal bleeding.  He is generally having a bowel movement every other day.     Result Review : { Labs  Result Review  Imaging  Med Tab  Media :23}      Office Visit with Olivia Davalos APRN (09/13/2021)   Tissue Pathology Exam (03/01/2021 08:20)   COLONOSCOPY (03/01/2021 08:00)   Patient Message with Saroj Darnell MD (08/17/2021)   Office Visit with Saroj Darnell MD (05/20/2021)   CBC & Differential (12/20/2021 10:34)  Comprehensive Metabolic Panel (12/20/2021 10:34)      Vital Signs:   /62   Pulse 50   Temp 97.5 °F (36.4 °C)   Ht 172.7 cm (68\")   Wt 73.4 kg (161 lb 14.4 oz)   SpO2 100%   BMI 24.62 kg/m²     Body mass index is 24.62 kg/m².     Physical Exam  Vitals reviewed.   Constitutional:       General: He is not in acute distress.     Appearance: He is well-developed.   HENT:      Head: Normocephalic and atraumatic.   Pulmonary:      Effort: Pulmonary effort is normal. No respiratory distress.   Abdominal:      General: Abdomen is flat. Bowel sounds are normal. There is no distension.      Palpations: Abdomen is soft.      Tenderness: There is no abdominal tenderness.   Skin:     General: Skin is dry.      Coloration: Skin is not pale.   Neurological:      Mental Status: He is alert and oriented to person, place, and time.   Psychiatric:         Thought Content: " Thought content normal.           Assessment and Plan {CC Problem List  Visit Diagnosis  ROS  Review (Popup)  Health Maintenance  Quality  BestPractice  Medications  SmartSets  SnapShot Encounters  Media :23}   Diagnoses and all orders for this visit:    1. 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 High risk medication use         Discussion  Patient presents today for follow-up of ulcerative colitis.  He continues on Entyvio every 8 weeks, started August 2021.  We will plan to schedule updated colonoscopy for surveillance and to reassess for any evidence of active disease since starting Entyvio.  If there were evidence of active disease, may need to consider more frequent dosing regimen.  Reviewed health maintenance as it pertains to her's medication use at today's office visit.  Patient had recent lab work with his primary care provider which was stable.          Follow Up   Return for Follow up to review results after testing complete.    Patient Instructions   Schedule colonoscopy for surveillance of ulcerative colitis and to reassess extent and severity of disease after starting Entyvio.    Due to high risk medication use, it is recommended to stay up to date on vaccinations including a yearly flu vaccine. Do not receive any live virus vaccines. If you are ill, have a fever, or require an antibiotic please contact the office as we may consider holding dose of medication. Recommend yearly skin checks with dermatology.    For ulcerative colitis, continue treatment with Entyvio every 8 weeks.   Okay to continue using mesalamine enemas as needed.

## 2022-03-14 ENCOUNTER — PREP FOR SURGERY (OUTPATIENT)
Dept: SURGERY | Facility: SURGERY CENTER | Age: 62
End: 2022-03-14

## 2022-03-14 ENCOUNTER — OFFICE VISIT (OUTPATIENT)
Dept: GASTROENTEROLOGY | Facility: CLINIC | Age: 62
End: 2022-03-14

## 2022-03-14 VITALS
WEIGHT: 161.9 LBS | DIASTOLIC BLOOD PRESSURE: 62 MMHG | TEMPERATURE: 97.5 F | HEART RATE: 50 BPM | HEIGHT: 68 IN | SYSTOLIC BLOOD PRESSURE: 108 MMHG | OXYGEN SATURATION: 100 % | BODY MASS INDEX: 24.54 KG/M2

## 2022-03-14 DIAGNOSIS — K51.30 CHRONIC ULCERATIVE RECTOSIGMOIDITIS WITHOUT COMPLICATIONS: Primary | ICD-10-CM

## 2022-03-14 DIAGNOSIS — K51.90 ULCERATIVE COLITIS WITHOUT COMPLICATIONS, UNSPECIFIED LOCATION: Primary | ICD-10-CM

## 2022-03-14 DIAGNOSIS — Z79.899 HIGH RISK MEDICATION USE: ICD-10-CM

## 2022-03-14 PROCEDURE — 99214 OFFICE O/P EST MOD 30 MIN: CPT | Performed by: NURSE PRACTITIONER

## 2022-03-14 RX ORDER — SODIUM CHLORIDE 0.9 % (FLUSH) 0.9 %
10 SYRINGE (ML) INJECTION AS NEEDED
Status: CANCELLED | OUTPATIENT
Start: 2022-03-14

## 2022-03-14 RX ORDER — SODIUM CHLORIDE 0.9 % (FLUSH) 0.9 %
3 SYRINGE (ML) INJECTION EVERY 12 HOURS SCHEDULED
Status: CANCELLED | OUTPATIENT
Start: 2022-03-14

## 2022-03-14 RX ORDER — SODIUM CHLORIDE, SODIUM LACTATE, POTASSIUM CHLORIDE, CALCIUM CHLORIDE 600; 310; 30; 20 MG/100ML; MG/100ML; MG/100ML; MG/100ML
30 INJECTION, SOLUTION INTRAVENOUS CONTINUOUS PRN
Status: CANCELLED | OUTPATIENT
Start: 2022-03-14

## 2022-03-14 NOTE — PATIENT INSTRUCTIONS
Schedule colonoscopy for surveillance of ulcerative colitis and to reassess extent and severity of disease after starting Entyvio.    Due to high risk medication use, it is recommended to stay up to date on vaccinations including a yearly flu vaccine. Do not receive any live virus vaccines. If you are ill, have a fever, or require an antibiotic please contact the office as we may consider holding dose of medication. Recommend yearly skin checks with dermatology.    For ulcerative colitis, continue treatment with Entyvio every 8 weeks.   Okay to continue using mesalamine enemas as needed.

## 2022-03-18 ENCOUNTER — ANESTHESIA EVENT (OUTPATIENT)
Dept: SURGERY | Facility: SURGERY CENTER | Age: 62
End: 2022-03-18

## 2022-03-18 ENCOUNTER — HOSPITAL ENCOUNTER (OUTPATIENT)
Facility: SURGERY CENTER | Age: 62
Setting detail: HOSPITAL OUTPATIENT SURGERY
Discharge: HOME | End: 2022-03-18
Attending: INTERNAL MEDICINE | Admitting: INTERNAL MEDICINE

## 2022-03-18 ENCOUNTER — ANESTHESIA (OUTPATIENT)
Dept: SURGERY | Facility: SURGERY CENTER | Age: 62
End: 2022-03-18

## 2022-03-18 VITALS
OXYGEN SATURATION: 99 % | TEMPERATURE: 97.7 F | HEIGHT: 68 IN | WEIGHT: 157.8 LBS | BODY MASS INDEX: 23.92 KG/M2 | SYSTOLIC BLOOD PRESSURE: 112 MMHG | HEART RATE: 51 BPM | RESPIRATION RATE: 16 BRPM | DIASTOLIC BLOOD PRESSURE: 75 MMHG

## 2022-03-18 DIAGNOSIS — K51.90 ULCERATIVE COLITIS WITHOUT COMPLICATIONS, UNSPECIFIED LOCATION: ICD-10-CM

## 2022-03-18 PROCEDURE — 45380 COLONOSCOPY AND BIOPSY: CPT | Performed by: INTERNAL MEDICINE

## 2022-03-18 PROCEDURE — 25010000002 PROPOFOL 10 MG/ML EMULSION: Performed by: ANESTHESIOLOGY

## 2022-03-18 PROCEDURE — 88305 TISSUE EXAM BY PATHOLOGIST: CPT | Performed by: INTERNAL MEDICINE

## 2022-03-18 RX ORDER — PROPOFOL 10 MG/ML
VIAL (ML) INTRAVENOUS AS NEEDED
Status: DISCONTINUED | OUTPATIENT
Start: 2022-03-18 | End: 2022-03-18 | Stop reason: SURG

## 2022-03-18 RX ORDER — MAGNESIUM HYDROXIDE 1200 MG/15ML
LIQUID ORAL AS NEEDED
Status: DISCONTINUED | OUTPATIENT
Start: 2022-03-18 | End: 2022-03-18 | Stop reason: HOSPADM

## 2022-03-18 RX ORDER — LIDOCAINE HYDROCHLORIDE 20 MG/ML
INJECTION, SOLUTION INFILTRATION; PERINEURAL AS NEEDED
Status: DISCONTINUED | OUTPATIENT
Start: 2022-03-18 | End: 2022-03-18 | Stop reason: SURG

## 2022-03-18 RX ORDER — SODIUM CHLORIDE, SODIUM LACTATE, POTASSIUM CHLORIDE, CALCIUM CHLORIDE 600; 310; 30; 20 MG/100ML; MG/100ML; MG/100ML; MG/100ML
30 INJECTION, SOLUTION INTRAVENOUS CONTINUOUS PRN
Status: DISCONTINUED | OUTPATIENT
Start: 2022-03-18 | End: 2022-03-18 | Stop reason: HOSPADM

## 2022-03-18 RX ORDER — SODIUM CHLORIDE 0.9 % (FLUSH) 0.9 %
10 SYRINGE (ML) INJECTION AS NEEDED
Status: DISCONTINUED | OUTPATIENT
Start: 2022-03-18 | End: 2022-03-18 | Stop reason: HOSPADM

## 2022-03-18 RX ORDER — SODIUM CHLORIDE 0.9 % (FLUSH) 0.9 %
3 SYRINGE (ML) INJECTION EVERY 12 HOURS SCHEDULED
Status: DISCONTINUED | OUTPATIENT
Start: 2022-03-18 | End: 2022-03-18 | Stop reason: HOSPADM

## 2022-03-18 RX ADMIN — SODIUM CHLORIDE, POTASSIUM CHLORIDE, SODIUM LACTATE AND CALCIUM CHLORIDE 30 ML/HR: 600; 310; 30; 20 INJECTION, SOLUTION INTRAVENOUS at 11:38

## 2022-03-18 RX ADMIN — LIDOCAINE HYDROCHLORIDE 60 MG: 20 INJECTION, SOLUTION INFILTRATION; PERINEURAL at 12:24

## 2022-03-18 RX ADMIN — PROPOFOL 150 MG: 10 INJECTION, EMULSION INTRAVENOUS at 12:25

## 2022-03-18 RX ADMIN — PROPOFOL 160 MCG/KG/MIN: 10 INJECTION, EMULSION INTRAVENOUS at 12:25

## 2022-03-18 NOTE — ANESTHESIA PREPROCEDURE EVALUATION
Anesthesia Evaluation     Patient summary reviewed and Nursing notes reviewed   no history of anesthetic complications:  NPO Solid Status: > 8 hours  NPO Liquid Status: > 8 hours           Airway   Mallampati: II  TM distance: >3 FB  Neck ROM: full  No difficulty expected  Dental - normal exam     Pulmonary    (-) rhonchi, decreased breath sounds, wheezes, not a smoker  Cardiovascular   Exercise tolerance: good (4-7 METS)    Rhythm: regular  Rate: normal    (+) hyperlipidemia,   (-) hypertension, CAD, angina, CASPER, murmur      Neuro/Psych  (+) psychiatric history Depression,    (-) CVA  GI/Hepatic/Renal/Endo    (-) no renal disease, diabetes    ROS Comment: Ulcerative colitis    Musculoskeletal     Abdominal     Abdomen: soft.   Substance History      OB/GYN          Other   arthritis,                      Anesthesia Plan    ASA 2     MAC   total IV anesthesia  intravenous induction     Anesthetic plan, all risks, benefits, and alternatives have been provided, discussed and informed consent has been obtained with: patient.        CODE STATUS:

## 2022-03-18 NOTE — ANESTHESIA POSTPROCEDURE EVALUATION
"Patient: John Reis    Procedure Summary     Date: 03/18/22 Room / Location: [facility]    Anesthesia Start: 1221 Anesthesia Stop: 1242    Procedure: COLONOSCOPY (N/A ) Diagnosis:       Ulcerative colitis without complications, unspecified location (HCC)      (Ulcerative colitis without complications, unspecified location (HCC) [K51.90])    Surgeons: Saroj Darnell MD Provider: Walker Hidalgo MD    Anesthesia Type: MAC ASA Status: 2          Anesthesia Type: MAC    Vitals  Vitals Value Taken Time   BP 92/56 03/18/22 1254   Temp 36.5 °C (97.7 °F) 03/18/22 1244   Pulse 47 03/18/22 1254   Resp 16 03/18/22 1254   SpO2 95 % 03/18/22 1254           Post Anesthesia Care and Evaluation    Patient location during evaluation: bedside  Patient participation: complete - patient participated  Level of consciousness: awake and alert  Pain management: adequate  Airway patency: patent  Anesthetic complications: No anesthetic complications  PONV Status: none  Cardiovascular status: acceptable  Respiratory status: acceptable  Hydration status: acceptable    Comments: BP 92/56 (BP Location: Left arm, Patient Position: Lying)   Pulse (!) 47   Temp 36.5 °C (97.7 °F) (Temporal)   Resp 16   Ht 172.7 cm (68\")   Wt 71.6 kg (157 lb 12.8 oz)   SpO2 95%   BMI 23.99 kg/m²         "

## 2022-03-18 NOTE — H&P
No chief complaint on file.      [facility]  UC surveillance         Problem List:    Patient Active Problem List   Diagnosis   • ADD (attention deficit disorder)   • Depression   • ED (erectile dysfunction) of organic origin   • Herpes zoster dermatitis   • S/P inguinal hernia repair using synthetic patch   • Testicular pain, right   • Ulcerative colitis, chronic (HCC)   • Chronic pain of both knees   • Left hip pain   • Primary osteoarthritis of left hip   • Status post left hip replacement   • Ganglion cyst   • Tinea of nail   • Bunion, right foot   • Wellness examination   • Screening PSA (prostate specific antigen)   • Weakness of both upper extremities       Medical History:    Past Medical History:   Diagnosis Date   • ADHD (attention deficit hyperactivity disorder)    • Age-related osteopor w/curr pathol fx right lower leg w/routine heal    • Bloating    • Colitis    • Depression 09/02/2015   • Family history of colonic polyps    • Herpes zoster dermatitis 04/27/2016   • High risk medication use    • Hyperkalemia    • Hyperlipidemia    • Internal hemorrhoids    • Primary osteoarthritis of left hip 03/29/2018   • Tinnitus of both ears    • Ulcerative colitis (HCC)    • Ulcerative colitis (HCC)         Social History:    Social History     Socioeconomic History   • Marital status:    Tobacco Use   • Smoking status: Never Smoker   • Smokeless tobacco: Never Used   Vaping Use   • Vaping Use: Never used   Substance and Sexual Activity   • Alcohol use: Yes     Comment: soc   • Drug use: Never   • Sexual activity: Defer       Family History:   Family History   Problem Relation Age of Onset   • Diabetes Father    • Breast cancer Sister    • Diabetes Brother    • Hypertension Brother    • Heart attack Sister    • Colon polyps Mother    • Colonic polyp Other    • Colon cancer Neg Hx    • Crohn's disease Neg Hx    • Irritable bowel syndrome Neg Hx    • Ulcerative colitis Neg Hx        Surgical History:   Past Surgical  History:   Procedure Laterality Date   • COLONOSCOPY      colitis per patient   • COLONOSCOPY N/A 3/1/2021    Procedure: COLONOSCOPY;  Surgeon: Saroj Darnell MD;  Location: [facility] MA[facility] OR;  Service: Gastroenterology;  Laterality: N/A;   • HERNIA REPAIR      double inguinal hernia repair    • KNEE SURGERY  06/2014 June 2014 Right medial meniscus.   • SHOULDER ARTHROSCOPY     • TOTAL HIP ARTHROPLASTY Left 2018       No current facility-administered medications for this encounter.    Current Outpatient Medications:   •  Mesalamine-Cleanser 4 g kit, Insert 4 g into the rectum Every Night., Disp: 30 each, Rfl: 5  •  vedolizumab (Entyvio) 300 MG injection, Infuse 300 mg into a venous catheter 1 (One) Time. ENTYVIO 300MG IV AT WEEKS 0,2 & 6 THEN Q 8 WEEKS X ONE YEAR  [facility], Disp: , Rfl:     Allergies:   Allergies   Allergen Reactions   • Nsaids Other (See Comments)     Due to hx ulcerative colitis        The following portions of the patient's history were reviewed by me and updated as appropriate: review of systems, allergies, current medications, past family history, past medical history, past social history, past surgical history and problem list.    There were no vitals filed for this visit.    PHYSICAL EXAM:    CONSTITUTIONAL:  today's vital signs reviewed by me  GASTROINTESTINAL: abdomen is soft nontender nondistended with normal active bowel sounds, no masses are appreciated    Assessment/ Plan  UC surveillance    colonoscopy    Risks and benefits as well as alternatives to endoscopic evaluation were explained to the patient and they voiced understanding and wish to proceed.  These risks include but are not limited to the risk of bleeding, perforation, adverse reaction to sedation, and missed lesions.  The patient was given the opportunity to ask questions prior to the endoscopic procedure.

## 2022-03-21 LAB
LAB AP CASE REPORT: NORMAL
LAB AP CLINICAL INFORMATION: NORMAL
PATH REPORT.FINAL DX SPEC: NORMAL
PATH REPORT.GROSS SPEC: NORMAL

## 2022-04-04 ENCOUNTER — TELEPHONE (OUTPATIENT)
Dept: FAMILY MEDICINE CLINIC | Facility: CLINIC | Age: 62
End: 2022-04-04

## 2022-04-04 NOTE — TELEPHONE ENCOUNTER
Caller: John Reis    Relationship: Self    Best call back number: [phone]    What is the best time to reach you: BETWEEN 2-5 PM     Who are you requesting to speak with (clinical staff, provider,  specific staff member):  OR CLINICAL STAFF    What was the call regarding: PATIENT IS CALLING ABOUT BILLING ISSUE  WITH LABS DONE IN December.PATIENT STATES THAT HIS INSURANCE DID NOT COVER LABS BECAUSE THE REASON CODE WAS PUT IN AS ROUTINE INSTEAD OF MEDICAL. PATIENT WOULD LIKE BILL REVIEWED AND NEW BILL SENT TO [facility].   PLEASE CALL AND ADVISE PATIENT ON STATUS.    Do you require a callback: YES

## 2022-04-06 PROBLEM — E78.00 HIGH CHOLESTEROL: Status: ACTIVE | Noted: 2022-04-06

## 2022-05-25 ENCOUNTER — TELEPHONE (OUTPATIENT)
Dept: GASTROENTEROLOGY | Facility: CLINIC | Age: 62
End: 2022-05-25

## 2022-05-25 DIAGNOSIS — K51.30 CHRONIC ULCERATIVE RECTOSIGMOIDITIS WITHOUT COMPLICATIONS: Primary | ICD-10-CM

## 2022-05-25 NOTE — TELEPHONE ENCOUNTER
----- Message from LANG Mackenzie sent at 5/20/2022  7:49 AM EDT -----  Patient needs CBC, CMP, and CRP checked with infusion with [facility] in July or August if you can send orders to them. Thanks.  Faxed lab orders  to [facility].  meli  [phone]

## 2022-06-02 ENCOUNTER — OFFICE VISIT (OUTPATIENT)
Dept: GASTROENTEROLOGY | Facility: CLINIC | Age: 62
End: 2022-06-02

## 2022-06-02 VITALS
WEIGHT: 160.4 LBS | OXYGEN SATURATION: 98 % | BODY MASS INDEX: 24.31 KG/M2 | TEMPERATURE: 97.5 F | HEART RATE: 51 BPM | SYSTOLIC BLOOD PRESSURE: 122 MMHG | DIASTOLIC BLOOD PRESSURE: 78 MMHG | HEIGHT: 68 IN

## 2022-06-02 DIAGNOSIS — K51.00 ULCERATIVE CHRONIC PANCOLITIS WITHOUT COMPLICATIONS: Primary | ICD-10-CM

## 2022-06-02 DIAGNOSIS — Z79.899 HIGH RISK MEDICATION USE: ICD-10-CM

## 2022-06-02 DIAGNOSIS — R23.3 EASY BRUISING: ICD-10-CM

## 2022-06-02 PROCEDURE — 99214 OFFICE O/P EST MOD 30 MIN: CPT | Performed by: INTERNAL MEDICINE

## 2022-06-02 NOTE — PATIENT INSTRUCTIONS
Blood work today to assess for a cause for bruising.     Once we receive these results, our office will contact you to discuss updating your treatment plan as indicated     Continue with Entyvio as scheduled, if scheduling issue arise at next dosage due please contact our office for further aide in contacting [facility] and [facility] health infusion

## 2022-06-02 NOTE — PROGRESS NOTES
"Chief Complaint   Patient presents with   • GI Problem           History of Present Illness  He is a 62 year old male who presents to the office for follow-up.  He has a history of UC and is on Entyvio 300 mg every 8 weeks. Next infusion due in approximately 6 weeks.     Last colonoscopy was 3/2022 which was endoscopically negative with biopsies revealing quiescent colitis. Reports that his UC symptoms are well controlled for entire 8 week dosage.      Since 3/2022 he has re-introduced fermented foods and previously eliminated foods without noticeable change in bowel frequency or development of melena or hematochezia.     States that over the past few months he has noticed increased bruising secondary to significant other's dogs.  He is unsure if bruising is related to being around the large dogs or secondary to medication infusions.       COLONOSCOPY (03/18/2022 12:12)  Tissue Pathology Exam (03/18/2022 12:33)  Comprehensive Metabolic Panel (05/29/2022 02:01)  CBC & Differential (05/29/2022 02:01)    Review of Systems     Result Review :           Vital Signs:   /78   Pulse 51   Temp 97.5 °F (36.4 °C)   Ht 172.7 cm (68\")   Wt 72.8 kg (160 lb 6.4 oz)   SpO2 98%   BMI 24.39 kg/m²     Body mass index is 24.39 kg/m².     Physical Exam  Vitals reviewed.   Constitutional:       Appearance: Normal appearance.   Abdominal:      General: Bowel sounds are normal. There is no distension.      Palpations: Abdomen is soft. Abdomen is not rigid. There is no mass or pulsatile mass.      Tenderness: There is no abdominal tenderness. There is no guarding or rebound.   Musculoskeletal:      Comments: Ecchymosis lue           Assessment and Plan    Diagnoses and all orders for this visit:    1. Ulcerative chronic pancolitis without complications (HCC) (Primary)    2. High risk medication use  -     CBC & Differential  -     Comprehensive Metabolic Panel    3. Easy bruising  -     CBC & Differential  -     Comprehensive " Metabolic Panel  -     Vitamin D 25 Hydroxy       Plan:    Assess blood work today for cause of new onset bruising      I have reviewed and confirmed the accuracy of the HPI and Assessment and Plan as documented by the LANG Corley        Follow Up   Return if symptoms worsen or fail to improve.    Patient Instructions   Blood work today to assess for a cause for bruising.     Once we receive these results, our office will contact you to discuss updating your treatment plan as indicated     Continue with Entyvio as scheduled, if scheduling issue arise at next dosage due please contact our office for further aide in contacting [facility]

## 2022-06-03 LAB
25(OH)D3+25(OH)D2 SERPL-MCNC: 35.7 NG/ML (ref 30–100)
ALBUMIN SERPL-MCNC: 4.5 G/DL (ref 3.8–4.8)
ALBUMIN/GLOB SERPL: 2 {RATIO} (ref 1.2–2.2)
ALP SERPL-CCNC: 49 IU/L (ref 44–121)
ALT SERPL-CCNC: 15 IU/L (ref 0–44)
AST SERPL-CCNC: 27 IU/L (ref 0–40)
BASOPHILS # BLD AUTO: 0 X10E3/UL (ref 0–0.2)
BASOPHILS NFR BLD AUTO: 1 %
BILIRUB SERPL-MCNC: 0.7 MG/DL (ref 0–1.2)
BUN SERPL-MCNC: 13 MG/DL (ref 8–27)
BUN/CREAT SERPL: 15 (ref 10–24)
CALCIUM SERPL-MCNC: 9.5 MG/DL (ref 8.6–10.2)
CHLORIDE SERPL-SCNC: 103 MMOL/L (ref 96–106)
CO2 SERPL-SCNC: 25 MMOL/L (ref 20–29)
CREAT SERPL-MCNC: 0.84 MG/DL (ref 0.76–1.27)
EGFRCR SERPLBLD CKD-EPI 2021: 99 ML/MIN/1.73
EOSINOPHIL # BLD AUTO: 0.1 X10E3/UL (ref 0–0.4)
EOSINOPHIL NFR BLD AUTO: 3 %
ERYTHROCYTE [DISTWIDTH] IN BLOOD BY AUTOMATED COUNT: 12.7 % (ref 11.6–15.4)
GLOBULIN SER CALC-MCNC: 2.3 G/DL (ref 1.5–4.5)
GLUCOSE SERPL-MCNC: 86 MG/DL (ref 65–99)
HCT VFR BLD AUTO: 44.1 % (ref 37.5–51)
HGB BLD-MCNC: 15 G/DL (ref 13–17.7)
IMM GRANULOCYTES # BLD AUTO: 0 X10E3/UL (ref 0–0.1)
IMM GRANULOCYTES NFR BLD AUTO: 0 %
LYMPHOCYTES # BLD AUTO: 1.6 X10E3/UL (ref 0.7–3.1)
LYMPHOCYTES NFR BLD AUTO: 33 %
MCH RBC QN AUTO: 31.1 PG (ref 26.6–33)
MCHC RBC AUTO-ENTMCNC: 34 G/DL (ref 31.5–35.7)
MCV RBC AUTO: 92 FL (ref 79–97)
MONOCYTES # BLD AUTO: 0.5 X10E3/UL (ref 0.1–0.9)
MONOCYTES NFR BLD AUTO: 10 %
NEUTROPHILS # BLD AUTO: 2.6 X10E3/UL (ref 1.4–7)
NEUTROPHILS NFR BLD AUTO: 53 %
PLATELET # BLD AUTO: 229 X10E3/UL (ref 150–450)
POTASSIUM SERPL-SCNC: 4.1 MMOL/L (ref 3.5–5.2)
PROT SERPL-MCNC: 6.8 G/DL (ref 6–8.5)
RBC # BLD AUTO: 4.82 X10E6/UL (ref 4.14–5.8)
SODIUM SERPL-SCNC: 141 MMOL/L (ref 134–144)
WBC # BLD AUTO: 4.8 X10E3/UL (ref 3.4–10.8)

## 2022-06-08 ENCOUNTER — TELEPHONE (OUTPATIENT)
Dept: GASTROENTEROLOGY | Facility: CLINIC | Age: 62
End: 2022-06-08

## 2022-06-08 NOTE — TELEPHONE ENCOUNTER
Pt has tested positive for Covid, he is asking if it is ok to take Advil for aches and pains? Thank you

## 2022-06-10 ENCOUNTER — TELEPHONE (OUTPATIENT)
Dept: FAMILY MEDICINE CLINIC | Facility: CLINIC | Age: 62
End: 2022-06-10

## 2022-06-10 NOTE — TELEPHONE ENCOUNTER
Caller: ESTRELLITA SHEFFIELD    Relationship to patient: Emergency Contact    Best call back number: [phone]      Date of exposure: UNKNOWN    Date of positive COVID19 test: 06/08/22        COVID19 symptoms: HAS A VERY BAD COUGH AND CONGESTION NOT HAVING ANY SHORTNESS OF BREATH, BODY ACHES, TIRED.        Additional information or concerns: PATIENT HAS BEEN MEDICATING WITH SUDFED, SOME COUGH MEDICINE AND ALLEVE AND IT IS NOT HELPING THIS COUGH ANY AND THEY WOULD LIKE TO KNOW WHAT ELSE HE CAN TRY TO HELP GET RID OF THIS COUGH.     What is the patients preferred pharmacy:     [facility] [address] [place] [address] [address] [facility] [address] - [phone]  - [phone] FX

## 2022-06-13 NOTE — TELEPHONE ENCOUNTER
Called and spoke with wife and offered appointment.  She stated that she would call back and make appointment if needed

## 2022-06-21 ENCOUNTER — TELEPHONE (OUTPATIENT)
Dept: GASTROENTEROLOGY | Facility: CLINIC | Age: 62
End: 2022-06-21

## 2022-06-21 NOTE — TELEPHONE ENCOUNTER
Pt is wanting an ok and documentation so that his wife can give his Entyvio, she is a nurse.   Informed patient we cannot give permission for his wife to administer the infusions.  He reports it would be easier for her to do it than to call [facility] as they didn't show up for the last visit.  His wife already administered the infusion but he will continue to use [facility] for the future infusions.    Spoke with Zeynep at [facility] and discussed with her that the patient will continue care with [facility].  pk/lesiat

## 2022-06-27 ENCOUNTER — TELEPHONE (OUTPATIENT)
Dept: FAMILY MEDICINE CLINIC | Facility: CLINIC | Age: 62
End: 2022-06-27

## 2022-06-28 PROBLEM — M16.10 PRIMARY OSTEOARTHRITIS OF HIP: Status: ACTIVE | Noted: 2018-03-29

## 2022-06-28 PROBLEM — D69.6 THROMBOCYTOPENIA (HCC): Status: ACTIVE | Noted: 2022-06-28

## 2022-07-28 ENCOUNTER — TELEPHONE (OUTPATIENT)
Dept: FAMILY MEDICINE CLINIC | Facility: CLINIC | Age: 62
End: 2022-07-28

## 2022-10-06 ENCOUNTER — TELEPHONE (OUTPATIENT)
Dept: GASTROENTEROLOGY | Facility: CLINIC | Age: 62
End: 2022-10-06

## 2022-10-06 NOTE — TELEPHONE ENCOUNTER
Pt is asking for an order to start entyvio @ home again.  Message from LM  [facility] pk  [facility] pk  Discussed with patient and faxed order to [facility]. pk

## 2022-10-07 ENCOUNTER — TELEPHONE (OUTPATIENT)
Dept: GASTROENTEROLOGY | Facility: CLINIC | Age: 62
End: 2022-10-07

## 2022-10-07 NOTE — TELEPHONE ENCOUNTER
[facility]  Sent in new rx.    Last infusion 08/29.  Due 10/24.  pk

## 2022-10-08 ENCOUNTER — PATIENT MESSAGE (OUTPATIENT)
Dept: GASTROENTEROLOGY | Facility: CLINIC | Age: 62
End: 2022-10-08

## 2022-10-08 DIAGNOSIS — R23.3 EASY BRUISING: Primary | ICD-10-CM

## 2022-10-17 ENCOUNTER — LAB (OUTPATIENT)
Dept: LAB | Facility: HOSPITAL | Age: 62
End: 2022-10-17

## 2022-10-17 PROCEDURE — 85610 PROTHROMBIN TIME: CPT | Performed by: NURSE PRACTITIONER

## 2022-10-17 PROCEDURE — 80053 COMPREHEN METABOLIC PANEL: CPT | Performed by: NURSE PRACTITIONER

## 2022-10-17 PROCEDURE — 85730 THROMBOPLASTIN TIME PARTIAL: CPT | Performed by: NURSE PRACTITIONER

## 2022-10-17 PROCEDURE — 85025 COMPLETE CBC W/AUTO DIFF WBC: CPT | Performed by: NURSE PRACTITIONER

## 2022-10-19 ENCOUNTER — TELEPHONE (OUTPATIENT)
Dept: GASTROENTEROLOGY | Facility: CLINIC | Age: 62
End: 2022-10-19

## 2022-10-19 NOTE — TELEPHONE ENCOUNTER
Anita is having issues setting up nursing  for this patient. She is suggesting outpatient option. Please return her call at [phone]

## 2022-10-19 NOTE — TELEPHONE ENCOUNTER
Called [facility].  Told to call Zeynep Gisela at [phone] or [phone].  They will not accept this patient.   Calling to find out why.  pk    Spoke with ALEXI.  I was told that a lot of the nurses quit and they are short staffed.  They are unable to accept new patient's at this time.   Will discuss with patient. meli

## 2022-10-20 ENCOUNTER — TELEPHONE (OUTPATIENT)
Dept: GASTROENTEROLOGY | Facility: CLINIC | Age: 62
End: 2022-10-20

## 2022-10-20 NOTE — TELEPHONE ENCOUNTER
Notified patient that we are still trying to find home health to do his infusions.  Due to legal reasons, we cannot allow his wife who is an RN to administer the infusions.  I [facility] contact the patient tomorrow [facility]  Spoke with patient's spouse (Alanis Tate). [phone]    She is questioning why she is not allowed to administer the Entyvio infusions for her .  Explained to her that legally we cannot approve her to administer the infusions.  I will speak with our  to confirm and call her back.    Dr Darnell would not approve this in the past.    Notified Alanis Tate that our  is checking with the [facility] legal dept and we will call her back once we speak with them.  meli/lucero

## 2022-10-20 NOTE — TELEPHONE ENCOUNTER
Patients wife called and said that the [facility] no longer gives the Entyvio infusions and stated that there is no one in town that does these infusions.  She would like to know if she can give these infusions to Mr. Reis at home because she is an RN.  If Dr. Darnell won't allow this she would like to know why

## 2022-10-21 ENCOUNTER — TELEPHONE (OUTPATIENT)
Dept: GASTROENTEROLOGY | Facility: CLINIC | Age: 62
End: 2022-10-21

## 2022-10-21 NOTE — TELEPHONE ENCOUNTER
Just magdalena:  Patient reports he failed to tell you that he had a basal cell removed from his left shoulder approximately one month ago by the NP at Dr Marks's office.  They will continue to monitor the area on his shoulder every few months.  Per his wife, Alanis.

## 2022-10-25 ENCOUNTER — TELEPHONE (OUTPATIENT)
Dept: GASTROENTEROLOGY | Facility: CLINIC | Age: 62
End: 2022-10-25

## 2022-10-25 DIAGNOSIS — Z79.899 HIGH RISK MEDICATION USE: ICD-10-CM

## 2022-10-25 DIAGNOSIS — K51.00 ULCERATIVE PANCOLITIS WITHOUT COMPLICATION: Primary | ICD-10-CM

## 2022-10-25 PROBLEM — C44.619 BASAL CELL CARCINOMA (BCC) OF LEFT SHOULDER: Status: ACTIVE | Noted: 2022-10-25

## 2022-10-25 NOTE — TELEPHONE ENCOUNTER
Informed patient's wife Alanis Tate [phone] that patient needs Quantiferon Gold and Hepatitis Panel for Simran BRINK.    Informed Alanis that it is office policy that patient must get his infusions through home health or at the hospital.  We can't give approval for her to administer his infusions.  pk  Per S/[facility] and JT    [facility] is pending. pk

## 2022-10-27 ENCOUNTER — TELEPHONE (OUTPATIENT)
Dept: GASTROENTEROLOGY | Facility: CLINIC | Age: 62
End: 2022-10-27

## 2022-10-27 NOTE — TELEPHONE ENCOUNTER
Patient treatment for Entyvio will be being done through [facility]. They called stating they are waiting on labs to start his treatment hopefully later this week. Any questions call Jessica at [phone]

## 2022-10-28 ENCOUNTER — LAB (OUTPATIENT)
Dept: LAB | Facility: HOSPITAL | Age: 62
End: 2022-10-28

## 2022-10-28 LAB
HAV IGM SERPL QL IA: NORMAL
HBV CORE IGM SERPL QL IA: NORMAL
HBV SURFACE AG SERPL QL IA: NORMAL
HCV AB SER DONR QL: NORMAL

## 2022-10-28 PROCEDURE — 86480 TB TEST CELL IMMUN MEASURE: CPT | Performed by: INTERNAL MEDICINE

## 2022-10-28 PROCEDURE — 36415 COLL VENOUS BLD VENIPUNCTURE: CPT | Performed by: INTERNAL MEDICINE

## 2022-10-28 PROCEDURE — 80074 ACUTE HEPATITIS PANEL: CPT | Performed by: INTERNAL MEDICINE

## 2022-11-01 LAB
GAMMA INTERFERON BACKGROUND BLD IA-ACNC: 0.01 IU/ML
M TB IFN-G BLD-IMP: NEGATIVE
M TB IFN-G CD4+ BCKGRND COR BLD-ACNC: 0.01 IU/ML
M TB IFN-G CD4+CD8+ BCKGRND COR BLD-ACNC: 0.01 IU/ML
MITOGEN IGNF BLD-ACNC: >10 IU/ML
QUANTIFERON INCUBATION: NORMAL
SERVICE CMNT-IMP: NORMAL

## 2023-04-13 ENCOUNTER — TRANSCRIBE ORDERS (OUTPATIENT)
Dept: ADMINISTRATIVE | Facility: HOSPITAL | Age: 63
End: 2023-04-13
Payer: COMMERCIAL

## 2023-04-13 DIAGNOSIS — G45.3 AMAUROSIS FUGAX: Primary | ICD-10-CM

## 2023-05-25 ENCOUNTER — TELEPHONE (OUTPATIENT)
Dept: GASTROENTEROLOGY | Facility: CLINIC | Age: 63
End: 2023-05-25

## 2023-05-25 NOTE — TELEPHONE ENCOUNTER
Caller: MYKE    Relationship to patient: Provider    Best call back number: [phone]    Patient is needing: MYKE FROM [employer] REQUESTING FOR 6 MONTHS RECORD FOR PATIENT TO HELP AUTHORIZATION FOR MEDICATION.  PLEASE REACH OUT TO ASSIST. THANK YOU

## 2023-08-23 ENCOUNTER — TELEPHONE (OUTPATIENT)
Dept: GASTROENTEROLOGY | Facility: CLINIC | Age: 63
End: 2023-08-23
Payer: COMMERCIAL

## 2023-08-23 NOTE — TELEPHONE ENCOUNTER
08/16/2023   Rosaura (pharmacist) with [facility] called to let us know that the patient's wife administered the Entyvio infusion again.  She reports the  patient's wife told Brynn they had to cancel the appt because they were going out of town so she administered it herself.  She also reported that the patient's wife said she left the medication out for 24 hours so she had to infuse it.  (This was reported by Rosaura with [facility].)     Nursing said they attempted to call the patient several times to schedule the infusion but did not get a return call.  He is due for the next infusion on 08/18.  Brynn (Rosaura) said they don't feel comfortable due to the situation and will no longer send the medication to the patient's home.  They will have the nurse bring the medication when she arrives to administer the infusion.    I told Rosaura that we did not give permission for his wife to administer the infusions.     Rosaura with [facility]  [phone]

## 2023-08-29 NOTE — PROGRESS NOTES
"Chief Complaint   Patient presents with    Follow-up     Ulcerative Colitis           History of Present Illness  63-year-old male presents today for follow-up.    Last visit June 2, 2022.  He has a history of ulcerative colitis, last colonoscopy March 2022 with quiescent ulcerative colitis.  Family history of colon polyps in his mother.  He is on Entyvio 300 mg every 8 weeks.  They are requested Entyvio infusions being infused by his wife who is a RN.     They have concerned about bruising that occurs in response to a bump or touch of his upper extremities. No bruising to his lower extremities.   Blood work 10/2022 was normal with normal coags.     Vedolizumab was initiated August 2021 after colonoscopy March 2021 revealed moderate distal colitis without dysplasia.    Previous treatments include azathioprine and Apriso.    Also prior use of mesalamine enemas as needed for worsening symptoms.    Review of Systems     Result Review :       CBC & Differential (10/17/2022 10:42)  Protime-INR (10/17/2022 10:42)  aPTT (10/17/2022 10:42)  COLONOSCOPY (03/18/2022 12:12)  Vital Signs:   /72   Pulse (!) 48   Temp 97.7 øF (36.5 øC)   Ht 172.7 cm (68\")   Wt 73.2 kg (161 lb 6.4 oz)   SpO2 100%   BMI 24.54 kg/mý     Body mass index is 24.54 kg/mý.     Physical Exam  Constitutional:       General: He is not in acute distress.     Appearance: Normal appearance. He is well-developed. He is not ill-appearing.   Eyes:      General: No scleral icterus.  Pulmonary:      Effort: No respiratory distress.   Skin:     Coloration: Skin is not jaundiced or pale.   Neurological:      Mental Status: He is alert and oriented to person, place, and time.   Psychiatric:         Mood and Affect: Mood normal.         Behavior: Behavior normal.         Thought Content: Thought content normal.         Judgment: Judgment normal.         Assessment and Plan    Diagnoses and all orders for this visit:    1. 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 Easy bruising    3. Hx of adenomatous colonic polyps    4. Encounter for screening for malignant neoplasm of colon       We have discussed Entyvio infusions with our legal team and the infusions must be performed by a licensed and certified infusion center.   Discussed changing to a different GI provider that will allow for his wife to give the infusions.   Also offered option that he receive his infusions at an infusion center within [facility] to provide stability and consistency with his treatments.   Also discussed changing therapy with possible risk of flare with a different agent of treatment that does not require repeated infusions and can be administered at home with a different agent such as zeposia or stelara.     Plan to repeat colonoscopy to assess for active inflammation endoscopically and histologically and if in remission, could change to zeposia.   If active inflammation, we could change to stelara.   In the meantime, we will change to infusion center to try infusions at a stable facility and if this is tolerated and fits their lifestyle and they have a good experience and colon shows remission, will continue Entyvio.    High risk medication monitoring, orders placed for blood work, will also update clotting factors.     I have reviewed and confirmed the accuracy of the HPI and Assessment and Plan as documented by Katja Darnell MD

## 2023-08-30 ENCOUNTER — PREP FOR SURGERY (OUTPATIENT)
Dept: SURGERY | Facility: SURGERY CENTER | Age: 63
End: 2023-08-30
Payer: COMMERCIAL

## 2023-08-30 ENCOUNTER — OFFICE VISIT (OUTPATIENT)
Dept: GASTROENTEROLOGY | Facility: CLINIC | Age: 63
End: 2023-08-30
Payer: COMMERCIAL

## 2023-08-30 VITALS
HEIGHT: 68 IN | DIASTOLIC BLOOD PRESSURE: 72 MMHG | HEART RATE: 48 BPM | OXYGEN SATURATION: 100 % | TEMPERATURE: 97.7 F | BODY MASS INDEX: 24.46 KG/M2 | SYSTOLIC BLOOD PRESSURE: 120 MMHG | WEIGHT: 161.4 LBS

## 2023-08-30 DIAGNOSIS — Z86.010 HX OF ADENOMATOUS COLONIC POLYPS: ICD-10-CM

## 2023-08-30 DIAGNOSIS — K51.30 ULCERATIVE RECTOSIGMOIDITIS WITHOUT COMPLICATION: Primary | ICD-10-CM

## 2023-08-30 DIAGNOSIS — R23.3 EASY BRUISING: ICD-10-CM

## 2023-08-30 DIAGNOSIS — Z12.11 ENCOUNTER FOR SCREENING FOR MALIGNANT NEOPLASM OF COLON: ICD-10-CM

## 2023-08-30 DIAGNOSIS — K51.30 ULCERATIVE RECTOSIGMOIDITIS WITHOUT COMPLICATION: ICD-10-CM

## 2023-08-30 DIAGNOSIS — Z86.010 HX OF ADENOMATOUS COLONIC POLYPS: Primary | ICD-10-CM

## 2023-08-30 PROBLEM — Z86.0101 HX OF ADENOMATOUS COLONIC POLYPS: Status: ACTIVE | Noted: 2023-08-30

## 2023-08-30 PROCEDURE — 99214 OFFICE O/P EST MOD 30 MIN: CPT | Performed by: INTERNAL MEDICINE

## 2023-08-30 RX ORDER — SODIUM CHLORIDE, SODIUM LACTATE, POTASSIUM CHLORIDE, CALCIUM CHLORIDE 600; 310; 30; 20 MG/100ML; MG/100ML; MG/100ML; MG/100ML
30 INJECTION, SOLUTION INTRAVENOUS CONTINUOUS PRN
OUTPATIENT
Start: 2023-08-30

## 2023-08-30 RX ORDER — SODIUM CHLORIDE 0.9 % (FLUSH) 0.9 %
3 SYRINGE (ML) INJECTION EVERY 12 HOURS SCHEDULED
OUTPATIENT
Start: 2023-08-30

## 2023-08-30 RX ORDER — SODIUM CHLORIDE 0.9 % (FLUSH) 0.9 %
10 SYRINGE (ML) INJECTION AS NEEDED
OUTPATIENT
Start: 2023-08-30

## 2023-09-02 LAB
ERYTHROCYTE [DISTWIDTH] IN BLOOD BY AUTOMATED COUNT: 12.6 % (ref 11.6–15.4)
GAMMA INTERFERON BACKGROUND BLD IA-ACNC: 0.04 IU/ML
HAV IGM SERPL QL IA: NEGATIVE
HBV CORE IGM SERPL QL IA: NEGATIVE
HBV SURFACE AG SERPL QL IA: NEGATIVE
HCT VFR BLD AUTO: 44.8 % (ref 37.5–51)
HCV AB SERPL QL IA: NORMAL
HCV IGG SERPL QL IA: NON REACTIVE
HGB BLD-MCNC: 15.3 G/DL (ref 13–17.7)
INR PPP: 1 (ref 0.9–1.2)
M TB IFN-G BLD-IMP: NEGATIVE
M TB IFN-G CD4+ T-CELLS BLD-ACNC: 0.04 IU/ML
M TBIFN-G CD4+ CD8+T-CELLS BLD-ACNC: 0.04 IU/ML
MCH RBC QN AUTO: 31.5 PG (ref 26.6–33)
MCHC RBC AUTO-ENTMCNC: 34.2 G/DL (ref 31.5–35.7)
MCV RBC AUTO: 92 FL (ref 79–97)
MITOGEN IGNF BLD-ACNC: >10 IU/ML
PLATELET # BLD AUTO: 254 X10E3/UL (ref 150–450)
PROTHROMBIN TIME: 11.1 SEC (ref 9.1–12)
QUANTIFERON INCUBATION: NORMAL
RBC # BLD AUTO: 4.85 X10E6/UL (ref 4.14–5.8)
SERVICE CMNT-IMP: NORMAL
WBC # BLD AUTO: 4.9 X10E3/UL (ref 3.4–10.8)

## 2023-09-05 ENCOUNTER — TELEPHONE (OUTPATIENT)
Dept: GASTROENTEROLOGY | Facility: CLINIC | Age: 63
End: 2023-09-05
Payer: COMMERCIAL

## 2023-09-05 DIAGNOSIS — K51.30 ULCERATIVE RECTOSIGMOIDITIS WITHOUT COMPLICATION: Primary | ICD-10-CM

## 2023-09-05 RX ORDER — SODIUM CHLORIDE 9 MG/ML
250 INJECTION, SOLUTION INTRAVENOUS ONCE
OUTPATIENT
Start: 2023-10-09

## 2023-09-05 NOTE — TELEPHONE ENCOUNTER
Placed order in [facility] for Entyvio at [facility].  Last infusion was 08/21/2023.  He is due for the next infusion on 10/16/2023.  pk

## 2023-09-13 ENCOUNTER — PATIENT MESSAGE (OUTPATIENT)
Dept: GASTROENTEROLOGY | Facility: CLINIC | Age: 63
End: 2023-09-13
Payer: COMMERCIAL

## 2023-09-21 ENCOUNTER — TELEPHONE (OUTPATIENT)
Dept: GASTROENTEROLOGY | Facility: CLINIC | Age: 63
End: 2023-09-21
Payer: COMMERCIAL

## 2023-09-21 NOTE — TELEPHONE ENCOUNTER
Patient called asking for help/clarification regarding upcoming infusion and insurance coverage.  Cecy was not available hina , pt was informed she is going to call him back.

## 2023-10-16 ENCOUNTER — TELEPHONE (OUTPATIENT)
Dept: GASTROENTEROLOGY | Facility: CLINIC | Age: 63
End: 2023-10-16
Payer: COMMERCIAL

## 2023-10-16 DIAGNOSIS — K51.30 ULCERATIVE RECTOSIGMOIDITIS WITHOUT COMPLICATION: Primary | ICD-10-CM

## 2023-10-16 NOTE — TELEPHONE ENCOUNTER
"  Caller: John Reis \"Rusty\"    Relationship: Self    Best call back number: [phone]    What is the best time to reach you: ANYTIME    Who are you requesting to speak with (clinical staff, provider,  specific staff member): CLINICAL        What was the call regarding: PATIENT IS GETTING HIS SCOPE ON 11/20/23 AND STATED THAT DR. CAMACHO IS PUTTING IN AN ORDER FOR LABWORK TO BE DONE 3 DAYS LATER AND WANTED TO KNOW IF HE CAN GET THOSE LABS DRAWN WHILE HE'S THERE FOR HIS COLONOSCOPY INSTEAD OF COMING BACK.       "

## 2023-10-18 ENCOUNTER — TELEPHONE (OUTPATIENT)
Dept: GASTROENTEROLOGY | Facility: CLINIC | Age: 63
End: 2023-10-18
Payer: COMMERCIAL

## 2023-10-18 NOTE — TELEPHONE ENCOUNTER
Spoke with patient and his wife.  Patient had appt with [facility] yesterday.  They were not satisfied with the facility and did not get the infusion done yesterday.  They called [facility] and he is getting the infusion done today. 10/18  His wife is requesting that he get future infusions at [facility].  The insurance requires them to use an ambulatory facility or home health.  I will send in a referral to [facility] but I'm not sure insurance will pay for it to be administered at the hospital.  pk

## 2023-10-18 NOTE — TELEPHONE ENCOUNTER
Elizabeth, pharmacist from [facility] [phone] ext [phone],  left a Voice Message (~ 09:00 am) stating that patient canceled appointment w/RN for Entyvio.   Also, [facility] staff called pt to r/s but did not get an answer, they will try again later today.

## 2023-10-19 ENCOUNTER — TELEPHONE (OUTPATIENT)
Dept: GASTROENTEROLOGY | Facility: CLINIC | Age: 63
End: 2023-10-19
Payer: COMMERCIAL

## 2023-10-19 PROBLEM — Z12.11 ENCOUNTER FOR SCREENING FOR MALIGNANT NEOPLASM OF COLON: Status: ACTIVE | Noted: 2023-08-30

## 2023-10-19 NOTE — TELEPHONE ENCOUNTER
Discussed with patient's significant other (Alansi Tate) that patient needs to cancel the colonoscopy for now.  We will get a prior auth for Entyvio sc.  Once approved, we will get a fecal calprotectin before he starts Entyvio sc.    GET FECAL CALPROTECTIN BEFORE CHANGE IS MADE.  Pk/jt  Patient's last infusion was10/18/2023 with [facility].  pk

## 2023-11-13 ENCOUNTER — TELEPHONE (OUTPATIENT)
Dept: GASTROENTEROLOGY | Facility: CLINIC | Age: 63
End: 2023-11-13
Payer: COMMERCIAL

## 2023-11-21 ENCOUNTER — TELEPHONE (OUTPATIENT)
Dept: GASTROENTEROLOGY | Facility: CLINIC | Age: 63
End: 2023-11-21
Payer: COMMERCIAL

## 2023-11-21 ENCOUNTER — TELEPHONE (OUTPATIENT)
Dept: GASTROENTEROLOGY | Facility: CLINIC | Age: 63
End: 2023-11-21

## 2023-11-21 DIAGNOSIS — R19.7 DIARRHEA, UNSPECIFIED TYPE: ICD-10-CM

## 2023-11-21 DIAGNOSIS — K51.30 ULCERATIVE RECTOSIGMOIDITIS WITHOUT COMPLICATION: Primary | ICD-10-CM

## 2023-11-21 NOTE — TELEPHONE ENCOUNTER
Hub staff attempted to follow warm transfer process and was unsuccessful     Caller: ESTRELLITA SHEFFIELD    Relationship to patient: Emergency Contact    Best call back number: [phone]     Patient is needing: PATIENT'S SPOUSE IS CALLING TO SPEAK WITH JOJO IN REGARDS TO A TEST (FECAL CALPROTECTIN).  HIS MEDICATION HAS BEEN APPROVED.  PLEASE CALL BACK AS SOON AS POSSIBLE.

## 2023-11-21 NOTE — TELEPHONE ENCOUNTER
Discussed recommendations with Alanis.  He needs to get a fecal calprotectin prior to starting Entyvio injections.  Faxed in rx for Entyvio injection.  Fax  [phone] [facility].  Patient is due for injection on 12/13.  pk

## 2023-11-27 ENCOUNTER — TELEPHONE (OUTPATIENT)
Dept: GASTROENTEROLOGY | Facility: CLINIC | Age: 63
End: 2023-11-27
Payer: COMMERCIAL

## 2023-11-27 NOTE — TELEPHONE ENCOUNTER
"  Caller: John Reis \"Rusty\"    Relationship: Self    Best call back number: [phone]     What is the best time to reach you: ANYTIME, OKAY TO LEAVE VM    Who are you requesting to speak with (clinical staff, provider,  specific staff member): JOJO    What was the call regarding: PT RECEIVED PRIOR AUTH FORM FROM PHARMACY [facility] AND IS WANTING TO KNOW IF JOJO NEEDS A COPY OF IF THEY HAD SENT ONE TO HER AS WELL. PLEASE CALL PT BACK AND ADVISE.    "

## 2023-11-28 ENCOUNTER — LAB (OUTPATIENT)
Dept: LAB | Facility: HOSPITAL | Age: 63
End: 2023-11-28
Payer: COMMERCIAL

## 2023-11-28 PROCEDURE — 83993 ASSAY FOR CALPROTECTIN FECAL: CPT | Performed by: INTERNAL MEDICINE

## 2023-12-02 LAB — CALPROTECTIN STL-MCNT: 30 UG/G (ref 0–120)

## 2024-03-27 ENCOUNTER — OFFICE VISIT (OUTPATIENT)
Dept: GASTROENTEROLOGY | Facility: CLINIC | Age: 64
End: 2024-03-27
Payer: COMMERCIAL

## 2024-03-27 ENCOUNTER — PREP FOR SURGERY (OUTPATIENT)
Dept: SURGERY | Facility: SURGERY CENTER | Age: 64
End: 2024-03-27
Payer: COMMERCIAL

## 2024-03-27 ENCOUNTER — TELEPHONE (OUTPATIENT)
Dept: GASTROENTEROLOGY | Facility: CLINIC | Age: 64
End: 2024-03-27
Payer: COMMERCIAL

## 2024-03-27 VITALS
BODY MASS INDEX: 24.62 KG/M2 | TEMPERATURE: 97.8 F | DIASTOLIC BLOOD PRESSURE: 70 MMHG | OXYGEN SATURATION: 100 % | SYSTOLIC BLOOD PRESSURE: 112 MMHG | WEIGHT: 161.9 LBS | HEART RATE: 52 BPM

## 2024-03-27 DIAGNOSIS — K51.30 ULCERATIVE RECTOSIGMOIDITIS WITHOUT COMPLICATION: Primary | ICD-10-CM

## 2024-03-27 DIAGNOSIS — K51.00 ULCERATIVE PANCOLITIS WITHOUT COMPLICATION: ICD-10-CM

## 2024-03-27 DIAGNOSIS — Z12.11 ENCOUNTER FOR SCREENING FOR MALIGNANT NEOPLASM OF COLON: ICD-10-CM

## 2024-03-27 DIAGNOSIS — Z86.010 HX OF ADENOMATOUS COLONIC POLYPS: ICD-10-CM

## 2024-03-27 DIAGNOSIS — K51.30 ULCERATIVE RECTOSIGMOIDITIS WITHOUT COMPLICATION: ICD-10-CM

## 2024-03-27 DIAGNOSIS — R23.9 RECENT SKIN CHANGES: Primary | ICD-10-CM

## 2024-03-27 DIAGNOSIS — Z79.899 LONG-TERM CURRENT USE OF VEDOLIZUMAB: ICD-10-CM

## 2024-03-27 PROCEDURE — 99214 OFFICE O/P EST MOD 30 MIN: CPT | Performed by: NURSE PRACTITIONER

## 2024-03-27 RX ORDER — SODIUM CHLORIDE 0.9 % (FLUSH) 0.9 %
3 SYRINGE (ML) INJECTION EVERY 12 HOURS SCHEDULED
OUTPATIENT
Start: 2024-03-27

## 2024-03-27 RX ORDER — SODIUM CHLORIDE, SODIUM LACTATE, POTASSIUM CHLORIDE, CALCIUM CHLORIDE 600; 310; 30; 20 MG/100ML; MG/100ML; MG/100ML; MG/100ML
30 INJECTION, SOLUTION INTRAVENOUS CONTINUOUS PRN
OUTPATIENT
Start: 2024-03-27

## 2024-03-27 RX ORDER — SODIUM CHLORIDE 0.9 % (FLUSH) 0.9 %
10 SYRINGE (ML) INJECTION AS NEEDED
OUTPATIENT
Start: 2024-03-27

## 2024-03-27 RX ORDER — SULFAMETHOXAZOLE AND TRIMETHOPRIM 800; 160 MG/1; MG/1
1 TABLET ORAL 2 TIMES DAILY
Qty: 10 TABLET | Refills: 0 | Status: SHIPPED | OUTPATIENT
Start: 2024-03-27 | End: 2024-04-01

## 2024-03-27 RX ORDER — APIXABAN 2.5 MG/1
TABLET, FILM COATED ORAL
COMMUNITY
Start: 2024-03-26

## 2024-03-27 NOTE — PROGRESS NOTES
Chief Complaint   Patient presents with    Follow-up     Skin changes after Entyvio injection           History of Present Illness  63-year-old male presents today for work in appointment.  His wife is with him at today's visit.  Last visit August 30, 2023.  He has a history of ulcerative colitis, last colonoscopy 2022 with quiescent ulcerative colitis.  Family history of colon polyps in his mother.    Vedolizumab was initiated August 2021 after colonoscopy March 2021 revealed moderate distal colitis without dysplasia.    Previous treatments include azathioprine, Apriso and mesalamine enemas.    He has started vedolizumab subcu injections every 2 weeks.  He has had a total of 5 injections.  He received his most recent injection Monday night in the back of his right upper arm.  They are rotating the injection sites.  He has a small amount of itching after his previous injections that self resolves however Monday night he noticed a change in the color of his upper arm and forearm, skin was bright red, swollen warm to the touch and itchy.      They contacted [facility] at 3 AM and were instructed to take Benadryl.  Both patient and his wife report that the skin changes are less severe, there is no itching, the redness is almost completely resolved, only mild fluid in the elbow area is still present.  No shortness of breath, swelling of the lips, tongue or oral mucosa.    He does report looser consistency stools since starting Entyvio injections but no diarrhea, urgency or tenesmus or rectal bleeding.     Result Review :       CBC AND DIFFERENTIAL (03/19/2024 15:11)  COMPREHENSIVE METABOLIC PANEL (03/19/2024 15:11)    Vital Signs:   /70   Pulse 52   Temp 97.8 °F (36.6 °C)   Wt 73.4 kg (161 lb 14.4 oz)   SpO2 100%   BMI 24.62 kg/m²     Body mass index is 24.62 kg/m².     Physical Exam  Vitals reviewed.   Constitutional:       General: He is not in acute distress.     Appearance: Normal appearance. He is not  ill-appearing, toxic-appearing or diaphoretic.   Skin:     Comments: Right upper arm and right forearm ink drawing present, very mild change in color with slight hyperemia noted, no skin lesions, blisters, urticaria or lesions.  There is a small amount of swelling at the elbow area.     Neurological:      Mental Status: He is alert.       Assessment and Plan    Diagnoses and all orders for this visit:    1. Recent skin changes (Primary)    2. Ulcerative rectosigmoiditis without complication    3. Long-term current use of vedolizumab    4. Hx of adenomatous colonic polyps    Other orders  -     sulfamethoxazole-trimethoprim (Bactrim DS) 800-160 MG per tablet; Take 1 tablet by mouth 2 (Two) Times a Day for 5 days.  Dispense: 10 tablet; Refill: 0       When they first contacted the office, I was concerned more about cellulitis than an allergic reaction based on symptoms and pictures however during today's visit he has had 95% improvement in symptoms compared to picture, he has very mild hyperemia but otherwise no tenderness, skin lesions, skin changes.  There is a small amount of fluid at his elbow, no swelling, erythema, nontender.  Will start Bactrim 1 pill twice daily x 5 days.    Alarm symptoms discussed during today's visit including increased pain, increased surface area over 24 hours, fever, chills, inability to tolerate oral antibiotics and they were instructed to contact our office or proceed to emergency department.    He is due for his next Entyvio infusion April 8, 2024.  Recommend premedicating with Tylenol and Benadryl 25 to 50 mg 30 minutes prior to next injection.  Also recommend injecting into the stomach.    Longstanding ulcerative colitis, he is due for surveillance colonoscopy later this year, orders placed.    Both patient and his wife verbalized agreement and understanding with the above plan, all questions answered.          Patient Instructions   Alarm symptoms including increased surface area  over 24-hour time period, increasing pain, fever, chills, inability to tolerate oral antibiotics.    Start bactrim one pill twice daily x 5 days    Premedicate with tylenol and benadryl 30 min prior to next Entyvio injection- inject in stomach    Update telephone or MyChart April 9, day after next Entyvio injection, sooner if symptoms chagne or condition warrants         EMR Dragon/Transcription Disclaimer:  This document has been Dictated utilizing Dragon dictation.

## 2024-03-27 NOTE — TELEPHONE ENCOUNTER
Patient's wife (Alanis) called regarding reaction to Entyvio.  He was given the injection on Monday.  He c/o redness, swelling and itching at the injection site.  His right arm is itchy & swollen from his axilla to his elbow.  His arm feels warm and he has fluid in his elbow.  They called [facility] at 3:00am last night and was told to take Benadryl 50mg.  He is not short of breath.    Please advise.      Alanis sent a pic of his arm to her MyChart.  She couldn't get into his MyChart.

## 2024-03-27 NOTE — TELEPHONE ENCOUNTER
Notified (Alanis) that patient needs to go to the ER for possible cellulitis which covers the joint.  Pics were sent on Alanis's MyChart.  Tierney reviewed the pic.  Alanis states the swelling and redness has gone down today and she does not want to take him to the ER.  Scheduled appt with bob Perera today at 2:30.  pk

## 2024-03-27 NOTE — TELEPHONE ENCOUNTER
Hub staff attempted to follow warm transfer process and was unsuccessful     Caller: ESTRELLITA SHEFFIELD    Relationship to patient: Emergency Contact    Best call back number: [phone]     Patient is needing: PT IS HAVING A REACTION TO ENTYVIO. THEY SPOKE TO [facility] AND THEY ADVISED  PT TO SPEAK WITH OUR NURSE. PT ARM IS RED AND SWOLLEN WITH FLUID.

## 2024-03-27 NOTE — PATIENT INSTRUCTIONS
Alarm symptoms including increased surface area over 24-hour time period, increasing pain, fever, chills, inability to tolerate oral antibiotics.    Start bactrim one pill twice daily x 5 days    Premedicate with tylenol and benadryl 30 min prior to next Entyvio injection- inject in stomach    Update telephone or MyChart April 9, day after next Entyvio injection, sooner if symptoms chagne or condition warrants

## 2024-03-27 NOTE — TELEPHONE ENCOUNTER
Dariana, I am concerned that he is having cellulitis, not an allergic reaction.  Can they come to the office now to be seen otherwise I would recommend they go to the urgent care or to his primary care provider.  Is there any way they can head to the office and I could see them at [phone]?  Let me know.  Thank you  Nhi

## 2024-03-27 NOTE — TELEPHONE ENCOUNTER
Hub staff attempted to follow warm transfer process and was unsuccessful     Caller: ESTRELLITA SHEFFIELD    Relationship to patient: Emergency Contact    Best call back number: [phone] (Mobile)     Patient is needing: PTS SP CALLED TO [facility]V SHE GOT NURSE JOJO'S MESSAGE, AND THEY ARE ON THERE WAY TO APPT. TRIED TO CONTACT OFFICE TWICE AND UNABLE TO REACH ANYONE.

## 2024-04-05 RX ORDER — VEDOLIZUMAB 108 MG/.68ML
INJECTION, SOLUTION SUBCUTANEOUS
Qty: 1.36 ML | Refills: 5 | Status: SHIPPED | OUTPATIENT
Start: 2024-04-05

## 2024-04-19 ENCOUNTER — TELEPHONE (OUTPATIENT)
Dept: GASTROENTEROLOGY | Facility: CLINIC | Age: 64
End: 2024-04-19
Payer: COMMERCIAL

## 2024-04-19 NOTE — TELEPHONE ENCOUNTER
Patient c/o red, softball sized area after administering the Entyvio injection two weeks ago  He administered it in his belly this time as Tierney suggested.  He developed hives all over his back the next day which worsened over the next two days.  He used Benadryl cream which helped.    He is due for the Entyvio injection today.  He saw Tierney on 03/27/2024.    (He forgot to tell us about the reaction when it happened.)  Please advise.     04/19/2024  Informed patient to hold Entyvio injections for now.  Tierney will review on Monday.    Discussed w patient and will schedule appt.

## 2024-04-19 NOTE — TELEPHONE ENCOUNTER
Patient's wife believes he's had an allergic reaction to Entyvio before. States this happened about 2 weeks ago, but due to other circumstances they're only able to call now.   Is due another injection today and would like to know if it should be given or not.

## 2024-04-23 NOTE — TELEPHONE ENCOUNTER
Reviewed with Dr. Darnell, given repeated allergic reaction, we do not recommend continuing Entyvio injections.  Would recommend change in therapy to a different agent such as Stelara.     Based on review of his chart it looks like he has previously been treated with azathioprine, prednisone, mesalamine's and Dr. Darnell thinks he has been on Remicade years ago.  Could you please find out if patient has been on Remicade infusions in the past?    I could do a video visit if they wanted to discuss treatment options or change in therapy.    Nhi

## 2024-04-29 ENCOUNTER — TELEMEDICINE (OUTPATIENT)
Dept: GASTROENTEROLOGY | Facility: CLINIC | Age: 64
End: 2024-04-29
Payer: COMMERCIAL

## 2024-04-29 DIAGNOSIS — K51.30 ULCERATIVE RECTOSIGMOIDITIS WITHOUT COMPLICATION: Primary | ICD-10-CM

## 2024-04-29 DIAGNOSIS — T78.40XD ALLERGIC REACTION TO DRUG, SUBSEQUENT ENCOUNTER: ICD-10-CM

## 2024-04-29 PROCEDURE — 99214 OFFICE O/P EST MOD 30 MIN: CPT | Performed by: NURSE PRACTITIONER

## 2024-04-29 NOTE — TELEPHONE ENCOUNTER
Dariana, I saw him today via [employer] message.  Please begin insurance approval April 30, 2024 for Tish, patient will need loading infusion dose and then maintenance injections every 8 weeks.  We are stopping Entyvio injections due to allergic reaction.  Thank you.    Nhi

## 2024-04-29 NOTE — PROGRESS NOTES
Chief Complaint   Patient presents with    Follow-up     Entyvio reaction           History of Present Illness  63-year-old male presents today via [employer] video visit for allergic reactions to Entyvio injections. His wife is with him at today's visit.  Last visit March 27, 2024.   He has a history of ulcerative colitis, last colonoscopy 2022 with quiescent ulcerative colitis.  Family history of colon polyps in his mother.     Vedolizumab was initiated August 2021 after colonoscopy March 2021 revealed moderate distal colitis without dysplasia.    Previous treatments include azathioprine, Apriso and mesalamine enemas.     He has started vedolizumab subcu injections every 2 weeks December 2023.  He has had a total of 8 injections and has had an allergic reaction to the last 3 injections with worsening severity of allergic reaction following each injection.    He has a rash that presents separate from his injection site, and with his most recent injection blistering was reported, with pruritus and swelling, no shortness of breath, swelling of the lips, tongue or oral mucosa.     At his last office visit he was experiencing looser consistency stools since starting Entyvio injections without diarrhea, urgency, tenesmus or rectal bleeding.    At today's visit he has not noticed looser stools as he has been on pain medication following right total knee replacement with Dr. Galeana April 4, 2024.    You have chosen to receive care through a telehealth visit.    Do you consent to use a video/audio connection for your medical care today? Yes    Physical Exam  Constitutional:       General: He is not in acute distress.     Appearance: Normal appearance. He is well-developed. He is not ill-appearing.   Eyes:      General: No scleral icterus.  Pulmonary:      Effort: No respiratory distress.   Skin:     Coloration: Skin is not jaundiced or pale.   Neurological:      Mental Status: He is alert and oriented to person, place, and  time.   Psychiatric:         Mood and Affect: Mood normal.         Behavior: Behavior normal.         Thought Content: Thought content normal.         Judgment: Judgment normal.          Result Review :      QuantiFERON TB Gold (08/30/2023 13:58)  Hepatitis Panel, Acute (08/30/2023 13:58)  Calprotectin, Fecal - Stool, Per Rectum (11/28/2023 13:54)  COMPREHENSIVE METABOLIC PANEL (03/19/2024 15:11)  CBC AND DIFFERENTIAL (03/19/2024 15:11)    Assessment and Plan    Diagnoses and all orders for this visit:    1. 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 Allergic reaction to drug, subsequent encounter       Patient with history of ulcerative colitis, previously treated with mesalamine's, azathioprine and mesalamine enemas however given active inflammation Endoscopically March 2021 vedolizumab was initiated August 2021 and follow-up colonoscopy 2022 revealed quiescent ulcerative colitis.    Patient endoscopically had quiescent ulcerative colitis and in order to improve quality of life and avoid infusions, patient started vedolizumab subcu injections every 2 weeks December 2023.    He has had allergic reaction to the past 3 injections with blistering, urticaria, erythema, pruritus.    This has been discussed with Dr. Darnell and it is our recommendation that patient discontinue Entyvio injections.    We discussed change in therapy to a different mechanism of action, Stelara.    Risks, benefits, treatment regimen, side effects and monitoring discussed at today's visit.    He is currently scheduled for a colonoscopy October 21, 2024, I would like to postpone this until December 2024 as we are changing therapy and want to make sure that he has been on therapy for approximately 6 months prior to endoscopic evaluation/monitoring.    Blood work with tuberculosis, hepatitis is up-to-date and he has had recent CBC and CMP.    We will work on insurance approval for Stelara infusion induction dose x 1 then he  will begin Stelara subcu maintenance injections every 8 weeks.    Both patient and his wife verbalized agreement and understanding with the above plan, all questions answered.        Patient Instructions   Stop Entyvio injections.  We will work on insurance approval for Stelara    We will postpone colonoscopy to December 2024, our office will contact you with a different date, would like to make sure you have been on Stelara for 5 or 6 months prior to endoscopic evaluation.    If any change in symptoms concerning for a flare of ulcerative colitis, patient and his wife encouraged to contact the office and we could start low-dose prednisone at 10 mg while we are working on insurance approval for Stelara.           EMR Dragon/Transcription Disclaimer:  This document has been Dictated utilizing Dragon dictation.

## 2024-04-30 ENCOUNTER — SPECIALTY PHARMACY (OUTPATIENT)
Dept: GASTROENTEROLOGY | Facility: CLINIC | Age: 64
End: 2024-04-30
Payer: COMMERCIAL

## 2024-04-30 ENCOUNTER — TELEPHONE (OUTPATIENT)
Dept: GASTROENTEROLOGY | Facility: CLINIC | Age: 64
End: 2024-04-30
Payer: COMMERCIAL

## 2024-04-30 NOTE — TELEPHONE ENCOUNTER
Dariana, please begin insurance approval for Stelara infusion. I saw him yesterday via video visit, his note is finished. He has had QuantiFERON gold and hepatitis panel 8 months ago. He is stopping Entyvio.  If he needs updated blood work, please contact them as soon as possible this morning and put orders and I will sign them otherwise if we can begin insurance approval for his infusion, Sirena will work on insurance approval for his injections for maintenance therapy.   Discussed with Angie that the [employer] patient support form will help save money and allow him to get the stelara injections for $5.  pk

## 2024-04-30 NOTE — TELEPHONE ENCOUNTER
Caller: YOGI HILLS    Relationship: SELF    Best call back number: [phone]    What is the best time to reach you:     Who are you requesting to speak with (clinical staff, provider,  specific staff member): CLINICAL    Do you know the name of the person who called: JOJO    What was the call regarding: PAPERWORK FOR STELARA    Is it okay if the provider responds through MyChart:

## 2024-04-30 NOTE — PATIENT INSTRUCTIONS
Stop Entyvio injections.  We will work on insurance approval for Stelara    We will postpone colonoscopy to December 2024, our office will contact you with a different date, would like to make sure you have been on Stelara for 5 or 6 months prior to endoscopic evaluation.    If any change in symptoms concerning for a flare of ulcerative colitis, patient and his wife encouraged to contact the office and we could start low-dose prednisone at 10 mg while we are working on insurance approval for Stelara.

## 2024-05-09 ENCOUNTER — TELEPHONE (OUTPATIENT)
Dept: GASTROENTEROLOGY | Facility: CLINIC | Age: 64
End: 2024-05-09

## 2024-05-09 NOTE — TELEPHONE ENCOUNTER
Hub staff attempted to follow warm transfer process and was unsuccessful     Caller: MANNIE GANT    Relationship to patient: Other    Best call back number: [phone]    Patient is needing: MANNIE GANT  FOR [facility] CALLING TO DISCUSS STELARA RX WITH JOJO. PHARMACY ADVISED THEY NEED A PA FOR PATIENTS STELARA RX.  PLEASE REVIEW AND CONTACT MANNIE GANT       [facility] - [place], [place] - [address] - [phone]  - [phone] [address] [address]   Phone: [phone] Fax: [phone]

## 2024-05-14 ENCOUNTER — SPECIALTY PHARMACY (OUTPATIENT)
Dept: GASTROENTEROLOGY | Facility: CLINIC | Age: 64
End: 2024-05-14
Payer: COMMERCIAL

## 2024-05-14 NOTE — PROGRESS NOTES
Specialty Pharmacy     Infusion paperwork has been sent 4/30/24. Stelara will need to be filled at [facility].          Tabatha Garza  Specialty Pharmacy Technician

## 2024-05-17 ENCOUNTER — SPECIALTY PHARMACY (OUTPATIENT)
Dept: GASTROENTEROLOGY | Facility: CLINIC | Age: 64
End: 2024-05-17
Payer: COMMERCIAL

## 2024-05-17 ENCOUNTER — TELEPHONE (OUTPATIENT)
Dept: GASTROENTEROLOGY | Facility: CLINIC | Age: 64
End: 2024-05-17
Payer: COMMERCIAL

## 2024-05-17 NOTE — TELEPHONE ENCOUNTER
Notified patient [facility] was waiting on his [facility] ID card.  Gave card numbers to Bridget at [facility].  They will call him to get him scheduled for the infusion.  pk  ID:  [id number]  Gr:  [phone]  BIN  [id number]  pk

## 2024-05-17 NOTE — PROGRESS NOTES
Specialty Pharmacy     5/17 FRM said nurse from [facility] has tried to contact patient and he needs to return phone call to set up his account for the member ID  Spoke with patient and he just got a new phone so he will check voicemails and return call to nurse Pack with Tish Garza  Specialty Pharmacy Technician

## 2024-05-17 NOTE — PROGRESS NOTES
Specialty Pharmacy     5/17 Per Miriam at [facility] - patient has not been scheduled. Has tried to get member ID for Tish from [employer]. Called STEPHEN, and she will attempt to fix.      Tabatha Garza  Specialty Pharmacy Technician

## 2024-05-17 NOTE — TELEPHONE ENCOUNTER
Pt's wife : ESTRELLITA SHEFFIELD (Spouse)[phone] (Mobile) , left a VM stating that they called [facility] and they are not in network, therefore an infusion would cost $115.  In network price would be $20.     Last infusion was 4/8th/24.    They would like referral to be sent to a facility that is in network, for lower cost.

## 2024-05-17 NOTE — TELEPHONE ENCOUNTER
Patient calls stating he's having to switch from Entyvio to Stelara because of insurance reasons.  Would like to make office aware of that change in order to receive his medication.

## 2024-06-13 ENCOUNTER — SPECIALTY PHARMACY (OUTPATIENT)
Dept: GASTROENTEROLOGY | Facility: CLINIC | Age: 64
End: 2024-06-13
Payer: COMMERCIAL

## 2024-06-13 NOTE — PROGRESS NOTES
Specialty Pharmacy     Per Candi at [facility] infusion completed on 6/7 for Tish  Will send CUBA Garza  Specialty Pharmacy Technician

## 2024-06-14 ENCOUNTER — SPECIALTY PHARMACY (OUTPATIENT)
Dept: GASTROENTEROLOGY | Facility: CLINIC | Age: 64
End: 2024-06-14
Payer: COMMERCIAL

## 2024-06-14 DIAGNOSIS — K51.00 ULCERATIVE PANCOLITIS WITHOUT COMPLICATION: Primary | ICD-10-CM

## 2024-06-14 NOTE — PROGRESS NOTES
Specialty Pharmacy     Stelara PA approved  Key: [id number]  PA Case ID #: [id number]  STELARA INJ 90MG/ML is approved through 12/13/2024.        Tabatha Garza  Specialty Pharmacy Technician

## 2024-10-18 ENCOUNTER — TELEPHONE (OUTPATIENT)
Dept: GASTROENTEROLOGY | Facility: CLINIC | Age: 64
End: 2024-10-18

## 2024-10-18 ENCOUNTER — TELEPHONE (OUTPATIENT)
Dept: GASTROENTEROLOGY | Facility: CLINIC | Age: 64
End: 2024-10-18
Payer: COMMERCIAL

## 2024-10-18 NOTE — TELEPHONE ENCOUNTER
Hub staff attempted to follow warm transfer process and was unsuccessful     Caller: ESTRELLITA SHEFFIELD  -HAS  VERBAL    Relationship to patient: SPOUSE    Best call back number: [phone]     Patient is needing: IS PT STILL HAVING SCOPE ON MONDAY 10/21?  PLEASE ADVISE.   DOES NOT HAVE AN ARRIVAL TIME AND SURGERY NOR [facility] HAS CALLED WITH THE TIME.    PLEASE CALL SPOUSE BACK ASAP.

## 2024-10-18 NOTE — TELEPHONE ENCOUNTER
"        Hub staff attempted to follow warm transfer process and was unsuccessful     Caller: John Reis \"Rusty\"    Relationship to patient: Self    Best call back number: [phone]    Patient is needing: TO SPEAK WITH SOMEONE ABOUT HIS COLONOSCOPY        "

## 2024-10-21 ENCOUNTER — ANESTHESIA EVENT (OUTPATIENT)
Dept: SURGERY | Facility: SURGERY CENTER | Age: 64
End: 2024-10-21
Payer: COMMERCIAL

## 2024-10-21 ENCOUNTER — HOSPITAL ENCOUNTER (OUTPATIENT)
Facility: SURGERY CENTER | Age: 64
Setting detail: HOSPITAL OUTPATIENT SURGERY
Discharge: HOME | End: 2024-10-21
Attending: INTERNAL MEDICINE | Admitting: INTERNAL MEDICINE
Payer: COMMERCIAL

## 2024-10-21 ENCOUNTER — ANESTHESIA (OUTPATIENT)
Dept: SURGERY | Facility: SURGERY CENTER | Age: 64
End: 2024-10-21
Payer: COMMERCIAL

## 2024-10-21 VITALS
BODY MASS INDEX: 23.28 KG/M2 | RESPIRATION RATE: 16 BRPM | DIASTOLIC BLOOD PRESSURE: 67 MMHG | HEART RATE: 60 BPM | SYSTOLIC BLOOD PRESSURE: 97 MMHG | TEMPERATURE: 97.7 F | WEIGHT: 153.6 LBS | HEIGHT: 68 IN | OXYGEN SATURATION: 97 %

## 2024-10-21 DIAGNOSIS — Z86.0101 HX OF ADENOMATOUS COLONIC POLYPS: ICD-10-CM

## 2024-10-21 DIAGNOSIS — Z12.11 ENCOUNTER FOR SCREENING FOR MALIGNANT NEOPLASM OF COLON: ICD-10-CM

## 2024-10-21 DIAGNOSIS — K51.30 ULCERATIVE RECTOSIGMOIDITIS WITHOUT COMPLICATION: ICD-10-CM

## 2024-10-21 DIAGNOSIS — K51.00 ULCERATIVE PANCOLITIS WITHOUT COMPLICATION: ICD-10-CM

## 2024-10-21 PROCEDURE — 25010000002 LIDOCAINE 1 % SOLUTION: Performed by: NURSE ANESTHETIST, CERTIFIED REGISTERED

## 2024-10-21 PROCEDURE — 88305 TISSUE EXAM BY PATHOLOGIST: CPT | Performed by: INTERNAL MEDICINE

## 2024-10-21 PROCEDURE — 45380 COLONOSCOPY AND BIOPSY: CPT | Performed by: INTERNAL MEDICINE

## 2024-10-21 PROCEDURE — 25810000003 LACTATED RINGERS PER 1000 ML: Performed by: INTERNAL MEDICINE

## 2024-10-21 PROCEDURE — 25010000002 PROPOFOL 1000 MG/100ML EMULSION: Performed by: NURSE ANESTHETIST, CERTIFIED REGISTERED

## 2024-10-21 PROCEDURE — 25010000002 PROPOFOL 10 MG/ML EMULSION: Performed by: NURSE ANESTHETIST, CERTIFIED REGISTERED

## 2024-10-21 RX ORDER — LIDOCAINE HYDROCHLORIDE 10 MG/ML
0.5 INJECTION, SOLUTION INFILTRATION; PERINEURAL ONCE AS NEEDED
Status: DISCONTINUED | OUTPATIENT
Start: 2024-10-21 | End: 2024-10-21 | Stop reason: HOSPADM

## 2024-10-21 RX ORDER — SODIUM CHLORIDE, SODIUM LACTATE, POTASSIUM CHLORIDE, CALCIUM CHLORIDE 600; 310; 30; 20 MG/100ML; MG/100ML; MG/100ML; MG/100ML
30 INJECTION, SOLUTION INTRAVENOUS CONTINUOUS
Status: DISCONTINUED | OUTPATIENT
Start: 2024-10-21 | End: 2024-10-21 | Stop reason: HOSPADM

## 2024-10-21 RX ORDER — LAMOTRIGINE 100 MG/1
100 TABLET ORAL DAILY
COMMUNITY
Start: 2024-09-19

## 2024-10-21 RX ORDER — LIDOCAINE HYDROCHLORIDE 10 MG/ML
INJECTION, SOLUTION INFILTRATION; PERINEURAL AS NEEDED
Status: DISCONTINUED | OUTPATIENT
Start: 2024-10-21 | End: 2024-10-21 | Stop reason: SURG

## 2024-10-21 RX ORDER — PROPOFOL 10 MG/ML
INJECTION, EMULSION INTRAVENOUS AS NEEDED
Status: DISCONTINUED | OUTPATIENT
Start: 2024-10-21 | End: 2024-10-21 | Stop reason: SURG

## 2024-10-21 RX ORDER — SODIUM CHLORIDE 0.9 % (FLUSH) 0.9 %
10 SYRINGE (ML) INJECTION AS NEEDED
Status: DISCONTINUED | OUTPATIENT
Start: 2024-10-21 | End: 2024-10-21 | Stop reason: HOSPADM

## 2024-10-21 RX ADMIN — PROPOFOL 80 MG: 10 INJECTION, EMULSION INTRAVENOUS at 07:31

## 2024-10-21 RX ADMIN — LIDOCAINE HYDROCHLORIDE 30 MG: 10 INJECTION, SOLUTION INFILTRATION; PERINEURAL at 07:31

## 2024-10-21 RX ADMIN — SODIUM CHLORIDE, SODIUM LACTATE, POTASSIUM CHLORIDE, AND CALCIUM CHLORIDE 30 ML/HR: .6; .31; .03; .02 INJECTION, SOLUTION INTRAVENOUS at 06:53

## 2024-10-21 RX ADMIN — PROPOFOL 140 MCG/KG/MIN: 10 INJECTION, EMULSION INTRAVENOUS at 07:31

## 2024-10-21 NOTE — ANESTHESIA PREPROCEDURE EVALUATION
Anesthesia Evaluation     Patient summary reviewed and Nursing notes reviewed   no history of anesthetic complications:   NPO Solid Status: > 8 hours  NPO Liquid Status: > 2 hours           Airway   Mallampati: II  TM distance: >3 FB  Neck ROM: full  Dental      Pulmonary    Cardiovascular     (+) hyperlipidemia      Neuro/Psych  (+) psychiatric history  GI/Hepatic/Renal/Endo      Musculoskeletal     Abdominal    Substance History      OB/GYN          Other   arthritis,                 Anesthesia Plan    ASA 2     MAC     intravenous induction     Anesthetic plan, risks, benefits, and alternatives have been provided, discussed and informed consent has been obtained with: patient.    CODE STATUS:

## 2024-10-21 NOTE — ANESTHESIA POSTPROCEDURE EVALUATION
Patient: John Reis    Procedure Summary       Date: 10/21/24 Room / Location: [facility]  / [facility]    Anesthesia Start: 0729 Anesthesia Stop: 0753    Procedure: COLONOSCOPY TO CECUM FOR SCREENING WITH BIOPSY Diagnosis:       Ulcerative rectosigmoiditis without complication      Encounter for screening for malignant neoplasm of colon      Hx of adenomatous colonic polyps      Ulcerative pancolitis without complication      (Ulcerative rectosigmoiditis without complication [K51.30])      (Encounter for screening for malignant neoplasm of colon [Z12.11])      (Hx of adenomatous colonic polyps [Z86.010])      (Ulcerative pancolitis without complication [K51.00])    Surgeons: Saroj Darnell MD Provider: Sebastian Nathan MD    Anesthesia Type: MAC ASA Status: 2            Anesthesia Type: MAC    Vitals  Vitals Value Taken Time   /78 10/21/24 0813   Temp 36.5 °C (97.7 °F) 10/21/24 0752   Pulse 53 10/21/24 0812   Resp 16 10/21/24 0816   SpO2 98 % 10/21/24 0812   Vitals shown include unfiled device data.        Post Anesthesia Care and Evaluation    Patient location during evaluation: bedside  Patient participation: complete - patient participated  Level of consciousness: awake and alert  Pain management: adequate    Airway patency: patent  Anesthetic complications: No anesthetic complications  PONV Status: controlled  Cardiovascular status: blood pressure returned to baseline and acceptable  Respiratory status: acceptable  Hydration status: acceptable

## 2024-10-21 NOTE — H&P
No chief complaint on file.      HPI  Ulcerative colitis  polyps         Problem List:    Patient Active Problem List   Diagnosis    ADD (attention deficit disorder)    Depression    Erectile dysfunction    Herpes zoster dermatitis    S/P inguinal hernia repair using synthetic patch    Testicular pain, right    Ulcerative colitis without complications    Chronic pain of both knees    Left hip pain    Primary osteoarthritis of hip    Status post left hip replacement    Ganglion cyst    Tinea of nail    Bunion, right foot    Wellness examination    Screening PSA (prostate specific antigen)    Weakness of both upper extremities    High cholesterol    Thrombocytopenia    Basal cell carcinoma (BCC) of left shoulder    Hx of adenomatous colonic polyps    Encounter for screening for malignant neoplasm of colon       Medical History:    Past Medical History:   Diagnosis Date    ADHD (attention deficit hyperactivity disorder)     Age-related osteopor w/curr pathol fx right lower leg w/routine heal     Basal cell carcinoma (BCC) of left shoulder 10/25/2022    Bloating     Colitis     Colon polyp     Depression 09/02/2015    Family history of colonic polyps     Herpes zoster dermatitis 04/27/2016    High risk medication use     History of colonoscopy     Hyperkalemia     Hyperlipidemia     Internal hemorrhoids     Primary osteoarthritis of left hip 03/29/2018    Tinnitus of both ears     Ulcerative colitis     Ulcerative colitis         Social History:    Social History     Socioeconomic History    Marital status:    Tobacco Use    Smoking status: Never    Smokeless tobacco: Never   Vaping Use    Vaping status: Never Used   Substance and Sexual Activity    Alcohol use: Yes     Comment: soc    Drug use: Never    Sexual activity: Defer       Family History:   Family History   Problem Relation Age of Onset    Diabetes Father     Breast cancer Sister     Diabetes Brother     Hypertension Brother     Heart attack Sister      Colon polyps Mother     Colonic polyp Other     Colon cancer Neg Hx     Crohn's disease Neg Hx     Irritable bowel syndrome Neg Hx     Ulcerative colitis Neg Hx        Surgical History:   Past Surgical History:   Procedure Laterality Date    COLONOSCOPY      colitis per patient    COLONOSCOPY N/A 03/01/2021    Procedure: COLONOSCOPY;  Surgeon: Saroj Darnell MD;  Location: [facility];  Service: Gastroenterology;  Laterality: N/A;    COLONOSCOPY N/A 03/18/2022    Procedure: COLONOSCOPY;  Surgeon: Saroj Darnell MD;  [place] [facility] [place];  Service: Gastroenterology;  Laterality: N/A;  quiescent colitis    HERNIA REPAIR      double inguinal hernia repair     KNEE SURGERY  06/2014 June 2014 Right medial meniscus.    SHOULDER ARTHROSCOPY      TOTAL HIP ARTHROPLASTY Left 2018    TOTAL KNEE ARTHROPLASTY Right 04/03/2024         Current Facility-Administered Medications:     lactated ringers infusion, 30 mL/hr, Intravenous, Continuous, Saroj Darnell MD, Last Rate: 30 mL/hr at 10/21/24 0653, 30 mL/hr at 10/21/24 0653    lidocaine (XYLOCAINE) 1 % injection 0.5 mL, 0.5 mL, Intradermal, Once PRN, Saroj Darnell MD    sodium chloride 0.9 % flush 10 mL, 10 mL, Intravenous, PRN, Saroj Darnell MD    Allergies:   Allergies   Allergen Reactions    Nsaids Other (See Comments)     Due to hx ulcerative colitis    Vedolizumab Hives        The following portions of the patient's history were reviewed by me and updated as appropriate: review of systems, allergies, current medications, past family history, past medical history, past social history, past surgical history and problem list.    Vitals:    10/21/24 0648   BP: 128/88   Pulse: 66   Resp: 16   Temp: 98 °F (36.7 °C)   SpO2: 98%       PHYSICAL EXAM:    CONSTITUTIONAL:  today's vital signs reviewed by me  GASTROINTESTINAL: abdomen is soft nontender nondistended with normal active bowel sounds, no masses are appreciated    Assessment/ Plan  Ulcerative  colitis  Polyps    colonoscopy    Risks and benefits as well as alternatives to endoscopic evaluation were explained to the patient and they voiced understanding and wish to proceed.  These risks include but are not limited to the risk of bleeding, perforation, adverse reaction to sedation, and missed lesions.  The patient was given the opportunity to ask questions prior to the endoscopic procedure.

## 2024-10-22 LAB
CYTO UR: NORMAL
LAB AP CASE REPORT: NORMAL
LAB AP CLINICAL INFORMATION: NORMAL
LAB AP DIAGNOSIS COMMENT: NORMAL
PATH REPORT.FINAL DX SPEC: NORMAL
PATH REPORT.GROSS SPEC: NORMAL

## 2024-10-23 ENCOUNTER — TELEPHONE (OUTPATIENT)
Dept: GASTROENTEROLOGY | Facility: CLINIC | Age: 64
End: 2024-10-23
Payer: COMMERCIAL

## 2024-10-23 NOTE — TELEPHONE ENCOUNTER
(Message from Saroj Darnell sent at 10/23/2024  7:21 AM EDT)  Looks good.  I cannot do it in the chart because it is saying it is being merged.  Please document normal biopsies in the chart and schedule routine office follow-up. thank you    *Attempted to call patient regarding path from recent colonoscopy. Detailed message left on patient's voice mail; LMTRC with questions or concerns, and to schedule routine office follow-up. lb

## 2024-12-13 ENCOUNTER — SPECIALTY PHARMACY (OUTPATIENT)
Dept: GASTROENTEROLOGY | Facility: CLINIC | Age: 64
End: 2024-12-13
Payer: COMMERCIAL

## 2024-12-13 NOTE — PROGRESS NOTES
Specialty Pharmacy     Stelara PA approved  Request Reference Number: [id number]. STELARA INJ 90MG/ML is approved through 12/13/2025. Your patient may now fill this prescription and it will be covered.       Tabatha Garza  Specialty Pharmacy Technician

## 2025-05-14 ENCOUNTER — TELEPHONE (OUTPATIENT)
Dept: GASTROENTEROLOGY | Facility: CLINIC | Age: 65
End: 2025-05-14
Payer: COMMERCIAL

## 2025-05-14 NOTE — TELEPHONE ENCOUNTER
Patient called to report his Stelara injection failed.  He called his pharmacy and they will replace the defective Stelara pen. meli

## 2025-07-27 DIAGNOSIS — K51.00 ULCERATIVE PANCOLITIS WITHOUT COMPLICATION: Primary | ICD-10-CM

## 2025-07-29 RX ORDER — USTEKINUMAB 90 MG/ML
INJECTION, SOLUTION SUBCUTANEOUS
Qty: 1 ML | Refills: 1 | Status: SHIPPED | OUTPATIENT
Start: 2025-07-29

## 2025-08-01 ENCOUNTER — PATIENT MESSAGE (OUTPATIENT)
Dept: GASTROENTEROLOGY | Facility: CLINIC | Age: 65
End: 2025-08-01
Payer: COMMERCIAL

## 2025-08-05 RX ORDER — HYDROCORTISONE 25 MG/G
CREAM TOPICAL
Qty: 30 G | Refills: 1 | Status: SHIPPED | OUTPATIENT
Start: 2025-08-05

## 2025-08-05 RX ORDER — MESALAMINE 4 G/60 ML
4 KIT RECTAL NIGHTLY
Qty: 30 EACH | Refills: 5 | Status: SHIPPED | OUTPATIENT
Start: 2025-08-05

## 2025-08-06 ENCOUNTER — TELEPHONE (OUTPATIENT)
Dept: GASTROENTEROLOGY | Facility: CLINIC | Age: 65
End: 2025-08-06
Payer: COMMERCIAL

## (undated) DEVICE — VIAL FORMLN CAP 10PCT 20ML

## (undated) DEVICE — ADAPT CLN SCPE ENDO PORPOISE BX/50 DISP

## (undated) DEVICE — GOWN ISOL W/THUMB UNIV BLU BX/15

## (undated) DEVICE — GOWN PROC ENDOARMOR GI LVL3 HY/SHLD UNIV

## (undated) DEVICE — KT ORCA ORCAPOD DISP STRL

## (undated) DEVICE — FLEX ADVANTAGE 1500CC: Brand: FLEX ADVANTAGE

## (undated) DEVICE — CANN NASL CO2 TRULINK W/O2 A/

## (undated) DEVICE — Device

## (undated) DEVICE — SINGLE-USE BIOPSY FORCEPS: Brand: RADIAL JAW 4

## (undated) DEVICE — CANN O2 ETCO2 FITS ALL CONN CO2 SMPL A/ 7IN DISP LF